# Patient Record
Sex: MALE | Race: WHITE | NOT HISPANIC OR LATINO | Employment: OTHER | URBAN - METROPOLITAN AREA
[De-identification: names, ages, dates, MRNs, and addresses within clinical notes are randomized per-mention and may not be internally consistent; named-entity substitution may affect disease eponyms.]

---

## 2020-02-18 ENCOUNTER — TELEPHONE (OUTPATIENT)
Dept: OPHTHALMOLOGY | Facility: CLINIC | Age: 70
End: 2020-02-18

## 2020-02-18 NOTE — TELEPHONE ENCOUNTER
----- Message from Emeka Milan sent at 2/18/2020  4:16 PM CST -----  Contact: pt wife Margarette   Type:  Sooner Apoointment Request    Caller is requesting a sooner appointment.  Caller declined first available appointment listed below.  Caller will not accept being placed on the waitlist and is requesting a message be sent to doctor.    Name of Caller:  Pt with Margarette   When is the first available appointment?  06/20/2020   Symptoms:    Best Call Back Number:  (Cell) or  (Home)   Additional Information:  Wife called to make an appt for cataract, glaucoma, and diabetic check up

## 2020-06-30 ENCOUNTER — OFFICE VISIT (OUTPATIENT)
Dept: OPHTHALMOLOGY | Facility: CLINIC | Age: 70
End: 2020-06-30
Payer: MEDICARE

## 2020-06-30 DIAGNOSIS — E11.9 DIABETES MELLITUS TYPE 2 WITHOUT RETINOPATHY: Primary | ICD-10-CM

## 2020-06-30 DIAGNOSIS — E11.65 TYPE 2 DIABETES MELLITUS WITH HYPERGLYCEMIA, WITHOUT LONG-TERM CURRENT USE OF INSULIN: ICD-10-CM

## 2020-06-30 DIAGNOSIS — H40.1131 PRIMARY OPEN ANGLE GLAUCOMA (POAG) OF BOTH EYES, MILD STAGE: ICD-10-CM

## 2020-06-30 DIAGNOSIS — H25.13 NUCLEAR SCLEROSIS OF BOTH EYES: ICD-10-CM

## 2020-06-30 PROCEDURE — 92004 COMPRE OPH EXAM NEW PT 1/>: CPT | Mod: S$GLB,,, | Performed by: OPHTHALMOLOGY

## 2020-06-30 PROCEDURE — 99999 PR PBB SHADOW E&M-EST. PATIENT-LVL IV: CPT | Mod: PBBFAC,,, | Performed by: OPHTHALMOLOGY

## 2020-06-30 PROCEDURE — 99999 PR PBB SHADOW E&M-EST. PATIENT-LVL IV: ICD-10-PCS | Mod: PBBFAC,,, | Performed by: OPHTHALMOLOGY

## 2020-06-30 PROCEDURE — 92004 PR EYE EXAM, NEW PATIENT,COMPREHESV: ICD-10-PCS | Mod: S$GLB,,, | Performed by: OPHTHALMOLOGY

## 2020-06-30 NOTE — PROGRESS NOTES
HPI     New pt here for comprehensive eye exam for DM. States DM is controlled   with medications. States no complaints. Denies pain/ FOL will see floaters   OU every once in a while.Using travatan z OU QHS.     Hemoglobin A1C       Date                     Value               Ref Range             Status                01/31/2020               5.7 (H)             <5.7 % of tota*       Final                  Last edited by Giuliana Lau on 6/30/2020  3:24 PM. (History)        ROS     Negative for: Constitutional, Gastrointestinal, Neurological, Skin,   Genitourinary, Musculoskeletal, HENT, Endocrine, Cardiovascular, Eyes,   Respiratory, Psychiatric, Allergic/Imm, Heme/Lymph    Last edited by London Nelson Jr., MD on 6/30/2020  3:59 PM. (History)        Assessment /Plan     For exam results, see Encounter Report.    Diabetes mellitus type 2 without retinopathy    Type 2 DM  -no retinopathy seen on DFE today  -continue good blood pressure and glucose control  -F/U for yearly DFE  Nuclear sclerosis of both eyes  -no decrease in ADLS, no significant glare, and functionality is satisfactory  -counseled on what to expect if the cataracts worsening and how it can effect the vision  -continue to monitor and if vision changes patient can call for another appointment    Primary open angle glaucoma (POAG) of both eyes, mild stage  IOP OD 11 OS 12; C:D Ratio mildly elevated no notching no disc heme  Continue travatan qhs OU  Schedule HVF OCT ON, Pachy and gonio at next visit    Follow up in about 6 months (around 12/30/2020) for Dilated Diabetic Fundus Exam.

## 2020-06-30 NOTE — PATIENT INSTRUCTIONS
Diabetic Retinopathy: Evaluating Your Eyes     Your eye healthcare provider examines your eyes with a slit lamp.   Diabetic retinopathy is a condition that happens when diabetes damages blood vessels in the rear of the eye. It can lead to vision loss. To help catch it early, have a complete dilated eye exam at least once a year. During the exam, the eye healthcare provider will review your medical history, examine your eyes, and check your vision.  Women who are pregnant and have pre-existing type 1 or type 2 diabetes have an increased risk of retinopathy. Women with diabetes should have an eye exam before pregnancy or in the first trimester. They should continue to be monitored every trimester and for 1 year after delivery, depending on the severity of the retinopathy.  Your medical history  Your eye healthcare provider may ask about the following:  · Your diabetes type, history, treatments (such as insulin), and how you monitor your blood sugar level  · Your familys health, including whether any relative has had diabetes or diabetic retinopathy  · Any diseases, surgeries, or other medical procedures youve had  · Any medicines, herbs, or supplements you use, including those you buy over the counter  · Any problems with your vision you may be having, such as blurred or double vision, problems seeing at night, or flashes or floaters   Your eye exam  Your eye healthcare provider uses an eye chart and other tools to check your vision. Then he or she examines your eyes for signs of disease. You are given eye drops to widen (dilate) your pupils. You may have one or more of the following tests:  · Tonometry to measure fluid pressure inside the eye.  · Slit lamp exam to allow the healthcare provider to view the structures of your eye.  · Ultrasound to create an image of the eye using sound waves. Ultrasound may be used if blood is found in the clear gel that fills the eye (vitreous).  · Ocular coherence tomography  (OCT) to create an image of the retina using light waves. This shows if there is fluid leaking into certain parts of the eye. It can also measure the thickness of the retina.  Fluorescein angiography  This test may be done to check the health of the inside lining of the eye (retina). It also checks the tiny blood vessels (capillaries) that carry blood to the retina. During the test:  · Photographs are taken of the retina.  · A dye is then injected into the bloodstream through the arm or hand. The dye travels to the capillaries in the eye.  · More photographs are taken of the retina. The dye causes the capillaries to stand out on the photographs.  You may feel brief nausea during the procedure. For a few hours after the test, your skin, eyes, and urine may appear yellow. Talk with your healthcare provider for more information about this test.   Date Last Reviewed: 6/1/2016  © 7849-7436 [employer]. [address] [place] [address], [place] [address]. All rights reserved. This information is not intended as a substitute for professional medical care. Always follow your healthcare professional's instructions.

## 2021-02-25 ENCOUNTER — OFFICE VISIT (OUTPATIENT)
Dept: PODIATRY | Facility: CLINIC | Age: 71
End: 2021-02-25
Payer: MEDICARE

## 2021-02-25 VITALS — HEIGHT: 65 IN | BODY MASS INDEX: 29.87 KG/M2 | WEIGHT: 179.25 LBS

## 2021-02-25 DIAGNOSIS — M20.42 HAMMERTOES OF BOTH FEET: ICD-10-CM

## 2021-02-25 DIAGNOSIS — B35.1 ONYCHOMYCOSIS DUE TO DERMATOPHYTE: ICD-10-CM

## 2021-02-25 DIAGNOSIS — E11.42 DIABETIC POLYNEUROPATHY ASSOCIATED WITH TYPE 2 DIABETES MELLITUS: ICD-10-CM

## 2021-02-25 DIAGNOSIS — M20.41 HAMMERTOES OF BOTH FEET: ICD-10-CM

## 2021-02-25 DIAGNOSIS — L84 CORN OR CALLUS: Primary | ICD-10-CM

## 2021-02-25 PROCEDURE — 11056 PARNG/CUTG B9 HYPRKR LES 2-4: CPT | Mod: Q9,S$GLB,, | Performed by: PODIATRIST

## 2021-02-25 PROCEDURE — 11721 PR DEBRIDEMENT OF NAILS, 6 OR MORE: ICD-10-PCS | Mod: 59,Q9,S$GLB, | Performed by: PODIATRIST

## 2021-02-25 PROCEDURE — 99204 OFFICE O/P NEW MOD 45 MIN: CPT | Mod: 25,S$GLB,, | Performed by: PODIATRIST

## 2021-02-25 PROCEDURE — 3008F BODY MASS INDEX DOCD: CPT | Mod: CPTII,S$GLB,, | Performed by: PODIATRIST

## 2021-02-25 PROCEDURE — 1159F MED LIST DOCD IN RCRD: CPT | Mod: S$GLB,,, | Performed by: PODIATRIST

## 2021-02-25 PROCEDURE — 3008F PR BODY MASS INDEX (BMI) DOCUMENTED: ICD-10-PCS | Mod: CPTII,S$GLB,, | Performed by: PODIATRIST

## 2021-02-25 PROCEDURE — 1126F PR PAIN SEVERITY QUANTIFIED, NO PAIN PRESENT: ICD-10-PCS | Mod: S$GLB,,, | Performed by: PODIATRIST

## 2021-02-25 PROCEDURE — 11056 PR TRIM BENIGN HYPERKERATOTIC SKIN LESION,2-4: ICD-10-PCS | Mod: Q9,S$GLB,, | Performed by: PODIATRIST

## 2021-02-25 PROCEDURE — 99999 PR PBB SHADOW E&M-EST. PATIENT-LVL III: ICD-10-PCS | Mod: PBBFAC,,, | Performed by: PODIATRIST

## 2021-02-25 PROCEDURE — 3288F FALL RISK ASSESSMENT DOCD: CPT | Mod: CPTII,S$GLB,, | Performed by: PODIATRIST

## 2021-02-25 PROCEDURE — 99204 PR OFFICE/OUTPT VISIT, NEW, LEVL IV, 45-59 MIN: ICD-10-PCS | Mod: 25,S$GLB,, | Performed by: PODIATRIST

## 2021-02-25 PROCEDURE — 1101F PT FALLS ASSESS-DOCD LE1/YR: CPT | Mod: CPTII,S$GLB,, | Performed by: PODIATRIST

## 2021-02-25 PROCEDURE — 11721 DEBRIDE NAIL 6 OR MORE: CPT | Mod: 59,Q9,S$GLB, | Performed by: PODIATRIST

## 2021-02-25 PROCEDURE — 1101F PR PT FALLS ASSESS DOC 0-1 FALLS W/OUT INJ PAST YR: ICD-10-PCS | Mod: CPTII,S$GLB,, | Performed by: PODIATRIST

## 2021-02-25 PROCEDURE — 99999 PR PBB SHADOW E&M-EST. PATIENT-LVL III: CPT | Mod: PBBFAC,,, | Performed by: PODIATRIST

## 2021-02-25 PROCEDURE — 3288F PR FALLS RISK ASSESSMENT DOCUMENTED: ICD-10-PCS | Mod: CPTII,S$GLB,, | Performed by: PODIATRIST

## 2021-02-25 PROCEDURE — 1126F AMNT PAIN NOTED NONE PRSNT: CPT | Mod: S$GLB,,, | Performed by: PODIATRIST

## 2021-02-25 PROCEDURE — 1159F PR MEDICATION LIST DOCUMENTED IN MEDICAL RECORD: ICD-10-PCS | Mod: S$GLB,,, | Performed by: PODIATRIST

## 2021-02-25 PROCEDURE — 3044F PR MOST RECENT HEMOGLOBIN A1C LEVEL <7.0%: ICD-10-PCS | Mod: CPTII,S$GLB,, | Performed by: PODIATRIST

## 2021-02-25 PROCEDURE — 3044F HG A1C LEVEL LT 7.0%: CPT | Mod: CPTII,S$GLB,, | Performed by: PODIATRIST

## 2021-06-28 ENCOUNTER — OFFICE VISIT (OUTPATIENT)
Dept: PODIATRY | Facility: CLINIC | Age: 71
End: 2021-06-28
Payer: MEDICARE

## 2021-06-28 VITALS — WEIGHT: 179.25 LBS | BODY MASS INDEX: 29.87 KG/M2 | HEIGHT: 65 IN

## 2021-06-28 DIAGNOSIS — B35.1 ONYCHOMYCOSIS DUE TO DERMATOPHYTE: ICD-10-CM

## 2021-06-28 DIAGNOSIS — L84 CORN OR CALLUS: ICD-10-CM

## 2021-06-28 DIAGNOSIS — E11.42 DIABETIC POLYNEUROPATHY ASSOCIATED WITH TYPE 2 DIABETES MELLITUS: Primary | ICD-10-CM

## 2021-06-28 DIAGNOSIS — M20.42 HAMMERTOES OF BOTH FEET: ICD-10-CM

## 2021-06-28 DIAGNOSIS — M20.41 HAMMERTOES OF BOTH FEET: ICD-10-CM

## 2021-06-28 PROCEDURE — 1101F PR PT FALLS ASSESS DOC 0-1 FALLS W/OUT INJ PAST YR: ICD-10-PCS | Mod: CPTII,S$GLB,, | Performed by: PODIATRIST

## 2021-06-28 PROCEDURE — 3044F HG A1C LEVEL LT 7.0%: CPT | Mod: CPTII,S$GLB,, | Performed by: PODIATRIST

## 2021-06-28 PROCEDURE — 3008F BODY MASS INDEX DOCD: CPT | Mod: CPTII,S$GLB,, | Performed by: PODIATRIST

## 2021-06-28 PROCEDURE — 99499 NO LOS: ICD-10-PCS | Mod: S$GLB,,, | Performed by: PODIATRIST

## 2021-06-28 PROCEDURE — 99999 PR PBB SHADOW E&M-EST. PATIENT-LVL II: ICD-10-PCS | Mod: PBBFAC,,, | Performed by: PODIATRIST

## 2021-06-28 PROCEDURE — 11721 PR DEBRIDEMENT OF NAILS, 6 OR MORE: ICD-10-PCS | Mod: Q9,S$GLB,, | Performed by: PODIATRIST

## 2021-06-28 PROCEDURE — 1125F PR PAIN SEVERITY QUANTIFIED, PAIN PRESENT: ICD-10-PCS | Mod: S$GLB,,, | Performed by: PODIATRIST

## 2021-06-28 PROCEDURE — 3044F PR MOST RECENT HEMOGLOBIN A1C LEVEL <7.0%: ICD-10-PCS | Mod: CPTII,S$GLB,, | Performed by: PODIATRIST

## 2021-06-28 PROCEDURE — 1157F PR ADVANCE CARE PLAN OR EQUIV PRESENT IN MEDICAL RECORD: ICD-10-PCS | Mod: S$GLB,,, | Performed by: PODIATRIST

## 2021-06-28 PROCEDURE — 11721 DEBRIDE NAIL 6 OR MORE: CPT | Mod: Q9,S$GLB,, | Performed by: PODIATRIST

## 2021-06-28 PROCEDURE — 1125F AMNT PAIN NOTED PAIN PRSNT: CPT | Mod: S$GLB,,, | Performed by: PODIATRIST

## 2021-06-28 PROCEDURE — 3008F PR BODY MASS INDEX (BMI) DOCUMENTED: ICD-10-PCS | Mod: CPTII,S$GLB,, | Performed by: PODIATRIST

## 2021-06-28 PROCEDURE — 3288F FALL RISK ASSESSMENT DOCD: CPT | Mod: CPTII,S$GLB,, | Performed by: PODIATRIST

## 2021-06-28 PROCEDURE — 1157F ADVNC CARE PLAN IN RCRD: CPT | Mod: S$GLB,,, | Performed by: PODIATRIST

## 2021-06-28 PROCEDURE — 99999 PR PBB SHADOW E&M-EST. PATIENT-LVL II: CPT | Mod: PBBFAC,,, | Performed by: PODIATRIST

## 2021-06-28 PROCEDURE — 99499 UNLISTED E&M SERVICE: CPT | Mod: S$GLB,,, | Performed by: PODIATRIST

## 2021-06-28 PROCEDURE — 3288F PR FALLS RISK ASSESSMENT DOCUMENTED: ICD-10-PCS | Mod: CPTII,S$GLB,, | Performed by: PODIATRIST

## 2021-06-28 PROCEDURE — 1101F PT FALLS ASSESS-DOCD LE1/YR: CPT | Mod: CPTII,S$GLB,, | Performed by: PODIATRIST

## 2021-10-28 ENCOUNTER — OFFICE VISIT (OUTPATIENT)
Dept: PODIATRY | Facility: CLINIC | Age: 71
End: 2021-10-28
Payer: MEDICAID

## 2021-10-28 DIAGNOSIS — L84 CORN OR CALLUS: ICD-10-CM

## 2021-10-28 DIAGNOSIS — B35.1 ONYCHOMYCOSIS DUE TO DERMATOPHYTE: ICD-10-CM

## 2021-10-28 DIAGNOSIS — M20.41 HAMMERTOES OF BOTH FEET: ICD-10-CM

## 2021-10-28 DIAGNOSIS — M20.42 HAMMERTOES OF BOTH FEET: ICD-10-CM

## 2021-10-28 DIAGNOSIS — R60.0 PERIPHERAL EDEMA: ICD-10-CM

## 2021-10-28 DIAGNOSIS — E11.42 DIABETIC POLYNEUROPATHY ASSOCIATED WITH TYPE 2 DIABETES MELLITUS: Primary | ICD-10-CM

## 2021-10-28 DIAGNOSIS — I87.2 VENOUS STASIS DERMATITIS OF RIGHT LOWER EXTREMITY: ICD-10-CM

## 2021-10-28 PROCEDURE — 11056 PARNG/CUTG B9 HYPRKR LES 2-4: CPT | Mod: PBBFAC,PN | Performed by: PODIATRIST

## 2021-10-28 PROCEDURE — 11721 PR DEBRIDEMENT OF NAILS, 6 OR MORE: ICD-10-PCS | Mod: 59,S$PBB,, | Performed by: PODIATRIST

## 2021-10-28 PROCEDURE — 11056 PARNG/CUTG B9 HYPRKR LES 2-4: CPT | Mod: S$PBB,,, | Performed by: PODIATRIST

## 2021-10-28 PROCEDURE — 11721 DEBRIDE NAIL 6 OR MORE: CPT | Mod: 59,S$PBB,, | Performed by: PODIATRIST

## 2021-10-28 PROCEDURE — 11056 PR TRIM BENIGN HYPERKERATOTIC SKIN LESION,2-4: ICD-10-PCS | Mod: S$PBB,,, | Performed by: PODIATRIST

## 2021-10-28 PROCEDURE — 99499 UNLISTED E&M SERVICE: CPT | Mod: S$PBB,,, | Performed by: PODIATRIST

## 2021-10-28 PROCEDURE — 11721 DEBRIDE NAIL 6 OR MORE: CPT | Mod: 59,PBBFAC,PN | Performed by: PODIATRIST

## 2021-10-28 PROCEDURE — 99499 NO LOS: ICD-10-PCS | Mod: S$PBB,,, | Performed by: PODIATRIST

## 2022-02-08 PROBLEM — R93.1 ABNORMAL ECHOCARDIOGRAM: Status: ACTIVE | Noted: 2022-02-08

## 2022-02-17 ENCOUNTER — TELEPHONE (OUTPATIENT)
Dept: VASCULAR SURGERY | Facility: CLINIC | Age: 72
End: 2022-02-17
Payer: MEDICARE

## 2022-02-17 NOTE — TELEPHONE ENCOUNTER
----- Message from Edgardo Schaeffer sent at 2/17/2022  9:11 AM CST -----  Type:  Sooner Apoointment Request    Caller is requesting a sooner appointment.  Caller declined first available appointment listed below.  Caller will not accept being placed on the waitlist and is requesting a message be sent to doctor.    Name of Caller:   Margarette Carmona (Spouse)     When is the first available appointment?  New Patient  Symptoms: Consult for surgery  Best Call Back Number:  [phone]  Additional Information:

## 2022-03-02 ENCOUNTER — TELEPHONE (OUTPATIENT)
Dept: VASCULAR SURGERY | Facility: CLINIC | Age: 72
End: 2022-03-02
Payer: MEDICARE

## 2022-03-02 NOTE — TELEPHONE ENCOUNTER
----- Message from Orquidea Jain MA sent at 3/2/2022  1:06 PM CST -----  Type: Needs Medical Advice  Who Called:  pt's wifeMargarette    Best Call Back Number: [phone]  Additional Information: wants to know if both her son & herself can come to the visit tomorrow as pt & wife are both forgetful--please advise--thank you

## 2022-03-03 ENCOUNTER — OFFICE VISIT (OUTPATIENT)
Dept: VASCULAR SURGERY | Facility: CLINIC | Age: 72
End: 2022-03-03
Payer: MEDICARE

## 2022-03-03 VITALS
BODY MASS INDEX: 26.79 KG/M2 | HEART RATE: 65 BPM | HEIGHT: 64 IN | WEIGHT: 156.94 LBS | SYSTOLIC BLOOD PRESSURE: 99 MMHG | DIASTOLIC BLOOD PRESSURE: 57 MMHG | RESPIRATION RATE: 20 BRPM

## 2022-03-03 DIAGNOSIS — I34.0 NONRHEUMATIC MITRAL VALVE REGURGITATION: ICD-10-CM

## 2022-03-03 DIAGNOSIS — I25.119 CORONARY ARTERY DISEASE INVOLVING NATIVE CORONARY ARTERY OF NATIVE HEART WITH ANGINA PECTORIS: Primary | ICD-10-CM

## 2022-03-03 PROCEDURE — 3288F PR FALLS RISK ASSESSMENT DOCUMENTED: ICD-10-PCS | Mod: CPTII,S$GLB,, | Performed by: THORACIC SURGERY (CARDIOTHORACIC VASCULAR SURGERY)

## 2022-03-03 PROCEDURE — 3074F SYST BP LT 130 MM HG: CPT | Mod: CPTII,S$GLB,, | Performed by: THORACIC SURGERY (CARDIOTHORACIC VASCULAR SURGERY)

## 2022-03-03 PROCEDURE — 3060F POS MICROALBUMINURIA REV: CPT | Mod: CPTII,S$GLB,, | Performed by: THORACIC SURGERY (CARDIOTHORACIC VASCULAR SURGERY)

## 2022-03-03 PROCEDURE — 1160F RVW MEDS BY RX/DR IN RCRD: CPT | Mod: CPTII,S$GLB,, | Performed by: THORACIC SURGERY (CARDIOTHORACIC VASCULAR SURGERY)

## 2022-03-03 PROCEDURE — 1159F PR MEDICATION LIST DOCUMENTED IN MEDICAL RECORD: ICD-10-PCS | Mod: CPTII,S$GLB,, | Performed by: THORACIC SURGERY (CARDIOTHORACIC VASCULAR SURGERY)

## 2022-03-03 PROCEDURE — 3066F NEPHROPATHY DOC TX: CPT | Mod: CPTII,S$GLB,, | Performed by: THORACIC SURGERY (CARDIOTHORACIC VASCULAR SURGERY)

## 2022-03-03 PROCEDURE — 1157F PR ADVANCE CARE PLAN OR EQUIV PRESENT IN MEDICAL RECORD: ICD-10-PCS | Mod: CPTII,S$GLB,, | Performed by: THORACIC SURGERY (CARDIOTHORACIC VASCULAR SURGERY)

## 2022-03-03 PROCEDURE — 99999 PR PBB SHADOW E&M-EST. PATIENT-LVL V: CPT | Mod: PBBFAC,,, | Performed by: THORACIC SURGERY (CARDIOTHORACIC VASCULAR SURGERY)

## 2022-03-03 PROCEDURE — 1157F ADVNC CARE PLAN IN RCRD: CPT | Mod: CPTII,S$GLB,, | Performed by: THORACIC SURGERY (CARDIOTHORACIC VASCULAR SURGERY)

## 2022-03-03 PROCEDURE — 3078F DIAST BP <80 MM HG: CPT | Mod: CPTII,S$GLB,, | Performed by: THORACIC SURGERY (CARDIOTHORACIC VASCULAR SURGERY)

## 2022-03-03 PROCEDURE — 99205 PR OFFICE/OUTPT VISIT, NEW, LEVL V, 60-74 MIN: ICD-10-PCS | Mod: S$GLB,,, | Performed by: THORACIC SURGERY (CARDIOTHORACIC VASCULAR SURGERY)

## 2022-03-03 PROCEDURE — 1159F MED LIST DOCD IN RCRD: CPT | Mod: CPTII,S$GLB,, | Performed by: THORACIC SURGERY (CARDIOTHORACIC VASCULAR SURGERY)

## 2022-03-03 PROCEDURE — 1160F PR REVIEW ALL MEDS BY PRESCRIBER/CLIN PHARMACIST DOCUMENTED: ICD-10-PCS | Mod: CPTII,S$GLB,, | Performed by: THORACIC SURGERY (CARDIOTHORACIC VASCULAR SURGERY)

## 2022-03-03 PROCEDURE — 3008F BODY MASS INDEX DOCD: CPT | Mod: CPTII,S$GLB,, | Performed by: THORACIC SURGERY (CARDIOTHORACIC VASCULAR SURGERY)

## 2022-03-03 PROCEDURE — 4010F PR ACE/ARB THEARPY RXD/TAKEN: ICD-10-PCS | Mod: CPTII,S$GLB,, | Performed by: THORACIC SURGERY (CARDIOTHORACIC VASCULAR SURGERY)

## 2022-03-03 PROCEDURE — 3066F PR DOCUMENTATION OF TREATMENT FOR NEPHROPATHY: ICD-10-PCS | Mod: CPTII,S$GLB,, | Performed by: THORACIC SURGERY (CARDIOTHORACIC VASCULAR SURGERY)

## 2022-03-03 PROCEDURE — 1126F PR PAIN SEVERITY QUANTIFIED, NO PAIN PRESENT: ICD-10-PCS | Mod: CPTII,S$GLB,, | Performed by: THORACIC SURGERY (CARDIOTHORACIC VASCULAR SURGERY)

## 2022-03-03 PROCEDURE — 4010F ACE/ARB THERAPY RXD/TAKEN: CPT | Mod: CPTII,S$GLB,, | Performed by: THORACIC SURGERY (CARDIOTHORACIC VASCULAR SURGERY)

## 2022-03-03 PROCEDURE — 3060F PR POS MICROALBUMINURIA RESULT DOCUMENTED/REVIEW: ICD-10-PCS | Mod: CPTII,S$GLB,, | Performed by: THORACIC SURGERY (CARDIOTHORACIC VASCULAR SURGERY)

## 2022-03-03 PROCEDURE — 3008F PR BODY MASS INDEX (BMI) DOCUMENTED: ICD-10-PCS | Mod: CPTII,S$GLB,, | Performed by: THORACIC SURGERY (CARDIOTHORACIC VASCULAR SURGERY)

## 2022-03-03 PROCEDURE — 1101F PT FALLS ASSESS-DOCD LE1/YR: CPT | Mod: CPTII,S$GLB,, | Performed by: THORACIC SURGERY (CARDIOTHORACIC VASCULAR SURGERY)

## 2022-03-03 PROCEDURE — 3074F PR MOST RECENT SYSTOLIC BLOOD PRESSURE < 130 MM HG: ICD-10-PCS | Mod: CPTII,S$GLB,, | Performed by: THORACIC SURGERY (CARDIOTHORACIC VASCULAR SURGERY)

## 2022-03-03 PROCEDURE — 99205 OFFICE O/P NEW HI 60 MIN: CPT | Mod: S$GLB,,, | Performed by: THORACIC SURGERY (CARDIOTHORACIC VASCULAR SURGERY)

## 2022-03-03 PROCEDURE — 99999 PR PBB SHADOW E&M-EST. PATIENT-LVL V: ICD-10-PCS | Mod: PBBFAC,,, | Performed by: THORACIC SURGERY (CARDIOTHORACIC VASCULAR SURGERY)

## 2022-03-03 PROCEDURE — 1101F PR PT FALLS ASSESS DOC 0-1 FALLS W/OUT INJ PAST YR: ICD-10-PCS | Mod: CPTII,S$GLB,, | Performed by: THORACIC SURGERY (CARDIOTHORACIC VASCULAR SURGERY)

## 2022-03-03 PROCEDURE — 3078F PR MOST RECENT DIASTOLIC BLOOD PRESSURE < 80 MM HG: ICD-10-PCS | Mod: CPTII,S$GLB,, | Performed by: THORACIC SURGERY (CARDIOTHORACIC VASCULAR SURGERY)

## 2022-03-03 PROCEDURE — 1126F AMNT PAIN NOTED NONE PRSNT: CPT | Mod: CPTII,S$GLB,, | Performed by: THORACIC SURGERY (CARDIOTHORACIC VASCULAR SURGERY)

## 2022-03-03 PROCEDURE — 3288F FALL RISK ASSESSMENT DOCD: CPT | Mod: CPTII,S$GLB,, | Performed by: THORACIC SURGERY (CARDIOTHORACIC VASCULAR SURGERY)

## 2022-03-03 RX ORDER — MAGNESIUM 30 MG
250 TABLET ORAL ONCE
COMMUNITY

## 2022-03-03 RX ORDER — LORATADINE 10 MG/1
10 TABLET ORAL DAILY
COMMUNITY

## 2022-03-03 RX ORDER — BROMPHENIRAMINE MALEATE, DEXTROMETHORPHAN HBR, PHENYLEPHRINE HCL, DIPHENHYDRAMINE HCL, PHENYLEPHRINE HCL 0.52G
0.52 KIT ORAL DAILY
COMMUNITY
End: 2023-08-21

## 2022-03-03 NOTE — PROGRESS NOTES
"OFFICE VISIT      This patient was referred to me by Dr. Olivier Romano    HISTORY OF PRESENT ILLNESS:  The patient is a 71-year-old gentleman has been experiencing shortness of breath, dyspnea on exertion, and orthopnea.  He has also having bilateral lower extremity edema for which he wears compression stockings.  He had a left heart catheterization which showed coronary artery disease and severely depressed left ventricular function with an ejection fraction of around 20%.  Echocardiogram showed moderate to severe mitral regurgitation with severely depressed left ventricular function of around 20%.  He also has mild aortic insufficiency.  It is very difficult to get a history from the patient.  He slept throughout most of the visit.  He was accompanied by his wife and his son.  They both stated that he is really slow.  When I asked his wife what that meant, she responded that "he cannot read or write well and cannot comprehend most things;  cannot fill in applications."  His wife states that is extremely forgetful, and most of the time cannot remember things that he has just done  He is also very weak and has a very unsteady gait and has had multiple falls.  His son explained that his primary care physician said that he has good muscles but the nerves are not working well and that is why he falls very frequently.  He cannot walk without a cane or a walker because he tends to fall.  He therefore sits most of the day.  He had 2 siblings who  with coronary artery disease and dementia.  When asked if he had dementia, his wife stated not that we know of.    Past Medical History:   Diagnosis Date    a Congestive Heart Failure     21 Inc. Lasix, RXd Potassium and Referred to Dr Romano     b Chronic Hypotension     b Hypertension     b Microalbuminuria     On Lisinopril 2.5 Mg Daily    Bell's palsy     c Hypercholesterolemia With Low HDL     17 RXd OTC Falxseed Oil 3K Mg Daily, And D/Cd His OTC FO " "2K Daily; On Lipitor 40 Mg qHS     d Type 2 Diabetes Mellitus     4/19/15 Referred To DM [facility]    f Obesity     g Mild Chronic NCNC Anemia     Am Monitoring    i H/O 1 PPD X 18 YRs TUD, Quit In 1995     i Left Sided Rib Area Pain     1/31/18 Ordered A Left Rib XRay Series    i Right Rib Area Pain After A Fall     2/17/20 Ordered A Right Rib Series    j Dysphagia ###    Dr. MANDY Diana; 7/1/16 EGD (With Empiric Dilation) = Normal Except For Gastritis (Was BXd)    j GERD With Gastritis     Dr. MANDY Diana; 7/1/16 EGD (With Empiric Dilation) = Normal Except For Gastritis (Was BXd)    j H/O Colon Polyps On 5/31/19 TC ###    Dr. MANDY Diana: "Repeat TC In 3 Years"    k Benign Prostatic Hypertrophy     Dr. Sam Rosales    k Chronic Interstitial Cystitis ###    Dr. Sam Rosales; Elmiron Caused Bilateral Leg Pruritis    l Bilateral Knee Osteoarthritis     12/9/16 Referred To Dr. Eder ugalde Fall With Right Sided Pain 10/20/15     l H/O Bilateral Knee Arthroscopy Procedures     l Lumbar Spinal DDD     4/19/15 RXd Mobic 15 Mg Daily PRN; He Has Had No SX Or ESIs    m Bilateral Lower Extremity Diabetic Peripheral Neuropathy     2/13/19 RXd Neurontin 100 Mg BID    n Anxiety And Depression     o Allergic Rhinosinusitis     Dr. Sahil Burks    o Chronic Bilateral Tinnitus And Hearing Loss     Dr. Sahil Burks; Christiano Barnard (Audiologist) 6/27/16 Tested Patient And Recommended Hearing Aids    o Deviated Nasal Septum     Dr. Sahil Burks    o Poor Dentition ###    5/24/19 He Will Be Having A Complete Dental Extraction Soon (I Filled Forms Out Today)    o Recurrent Bilateral Cerumen Impactions     Dr. Sahil Burks 6/15/16 Performed Disimpaction    p OU Glaucoma     Dr. Villarreal "Arlen" Vanessa    q Bilateral Lower Extremity Pruritus ###    RTC In 2 Weeks; 5/24/19 Held Elmiron And RXd Lotrisone Cream BID; 6/3/19 BLE AA U/S = Unremarkable (See Report)    q Borderline Vitamin D " Deficiency     2/10/19 RXd OTC D3 2K IU Daily    q Cherry Hemangiomas     Dr. Anthony Dorantes; Right Postauricular Hemangioma Was Resected 7/27/16    q H/O Right Buttock Hematoma 10/20/15     q Seborrheic Keratoses     Dr. Anthony Dorantes; Right Postauricular Seborrheic Keratosis Was Resected 7/27/16    Wellness Visit 2/17/2021 #######################    Dr. De Oliveira, this pt qualifies for advanced illness and frailty exclusion based on diagnoses in his chart. After his visit on Monday, please code for the following: combined systolic and diastolic CHF, venous ulcer of L leg/wound of RLE; limited level of ambulation. By adding these codes, an orange frailty banner should populate and be visible upon opening his chart. Please let me know if you have any       Past Surgical History:   Procedure Laterality Date    CATHETERIZATION OF BOTH LEFT AND RIGHT HEART N/A 2/8/2022    Procedure: CATHETERIZATION, HEART, BOTH LEFT AND RIGHT;  Surgeon: Olivier Romano MD;  Location: [facility] CATH;  Service: Cardiology;  Laterality: N/A;    COLONOSCOPY      CORONARY ANGIOGRAPHY N/A 2/8/2022    Procedure: ANGIOGRAM, CORONARY ARTERY;  Surgeon: Olivier Romano MD;  Location: [facility] CATH;  Service: Cardiology;  Laterality: N/A;    HERNIA REPAIR      KNEE SURGERY      NOSE SURGERY      TESTICLE SURGERY         Review of patient's allergies indicates:   Allergen Reactions    Elmiron [pentosan polysulfate sodium]      Bilateral Lower Extremity Pruritis       Current Outpatient Medications   Medication Sig Dispense Refill    acetaminophen (TYLENOL) 325 MG tablet Take 1 tablet (325 mg total) by mouth 2 (two) times daily. (Patient taking differently: Take 325 mg by mouth once daily.) 60 tablet 3    aspirin (ECOTRIN) 81 MG EC tablet Take 1 tablet (81 mg total) by mouth once daily. 90 tablet 3    atorvastatin (LIPITOR) 40 MG tablet Take 1 tablet (40 mg total) by mouth nightly. 90 tablet 3    baclofen (LIORESAL) 10 MG tablet TAKE 1  TABLET 3 TIMES DAILYAS NEEDED 270 tablet 3    blood glucose control, low (EASYMAX LOW CONTROL) Soln 1 each by Misc.(Non-Drug; Combo Route) route before breakfast. 3 each 3    blood sugar diagnostic Strp To check BG 2 times daily, to use with insurance preferred meter 200 each 3    bumetanide (BUMEX) 2 MG tablet Take 1 tablet (2 mg total) by mouth once daily. 30 tablet 11    cholecalciferol, vitamin D3, (VITAMIN D3) 50 mcg (2,000 unit) Tab Take 1 tablet (2,000 Units total) by mouth once daily.      clotrimazole-betamethasone 1-0.05% (LOTRISONE) cream Apply topically 2 (two) times daily. Apply topically to affected area bid 45 g 1    esomeprazole (NEXIUM) 40 MG capsule Take 1 capsule (40 mg total) by mouth before breakfast. 90 capsule 3    finasteride (PROSCAR) 5 mg tablet Take 1 tablet (5 mg total) by mouth once daily. 90 tablet 3    gabapentin (NEURONTIN) 100 MG capsule Take 1 capsule (100 mg total) by mouth 2 (two) times daily. 180 capsule 3    Lactobacillus rhamnosus GG (CULTURELLE) 10 billion cell capsule Take 1 capsule by mouth once daily.      lancets Misc To check BG 2 times daily, to use with insurance preferred meter 200 each 3    loratadine (CLARITIN) 10 mg tablet Take 10 mg by mouth once daily.      magnesium 30 mg Tab Take 250 mg by mouth once.      meloxicam (MOBIC) 15 MG tablet Take 1 tablet (15 mg total) by mouth daily as needed. 90 tablet 3    metFORMIN (GLUCOPHAGE) 1000 MG tablet Take 1 tablet (1,000 mg total) by mouth 2 (two) times daily. 180 tablet 3    metoprolol succinate (TOPROL-XL) 25 MG 24 hr tablet Take 1 tablet (25 mg total) by mouth once daily. 30 tablet 11    mirabegron (MYRBETRIQ) 25 mg Tb24 ER tablet Take 1 tablet (25 mg total) by mouth once daily. 90 tablet 3    multivitamin capsule Take 1 capsule by mouth once daily.      olopatadine (PATANOL) 0.1 % ophthalmic solution Place 1 drop into the left eye 2 (two) times daily. 5 mL 1    potassium chloride SA  (K-DUR,KLOR-CON) 10 MEQ tablet Take 1 tablet (10 mEq total) by mouth 2 (two) times daily. 60 tablet 3    psyllium 0.52 gram capsule Take 0.52 g by mouth once daily.      sacubitriL-valsartan (ENTRESTO) 24-26 mg per tablet Take 1 tablet by mouth 2 (two) times daily. Start taking on 2/10/22 60 tablet 11    silver sulfADIAZINE 1% (SILVADENE) 1 % cream Apply to affected area daily 50 g 1    solifenacin (VESICARE) 5 MG tablet Take 1 tablet (5 mg total) by mouth once daily. 90 tablet 3    tamsulosin (FLOMAX) 0.4 mg Cap Take 1 capsule (0.4 mg total) by mouth once daily. 90 capsule 3    travoprost (TRAVATAN Z) 0.004 % ophthalmic solution Place 1 drop into both eyes once daily. 5 mL 2    psyllium (METAMUCIL) packet Take 1 packet by mouth once daily.       No current facility-administered medications for this visit.       Family History   Problem Relation Age of Onset    Diabetes Mother     Heart disease Mother     Hypertension Mother     COPD Mother     Arthritis Mother     Cancer Sister        Social History     Socioeconomic History    Marital status:    Tobacco Use    Smoking status: Former Smoker     Quit date:      Years since quittin.1    Smokeless tobacco: Never Used   Substance and Sexual Activity    Alcohol use: Never    Drug use: Never       REVIEW OF SYSTEMS:  As per history of present illness.        PHYSICAL EXAM:  Physical Exam    Vitals:    22 1459   BP: (!) 99/57   Pulse: 65   Resp: 20     The patient was initially awake but then fell asleep and was repeatedly awakened his wife after which he would fall asleep again.  As a matter of fact he was asleep during most of the visit.  When asked questions, he would respond in the affirmative both to his family and to me.  He had no questions and was pretty much not involved in the conversation.  Head is normocephalic.  Atraumatic.  Pupils are equal, round and reactive.  Sclerae anicteric.  Neck is supple without jugular vein  distension trachea is midline.  Heart has a regular rate and rhythm without murmur.  Lungs are clear.  Abdomen is soft and nontender.  Extremities are warm and adequately perfused.  Neurologic:  The patient fell asleep for most of the visit.  He has no lateralizing neurologic deficits.  However his gait is very unsteady and used a walker to ambulate.  He was also held up by his son.    Transthoracic echo (TTE) complete  Order# [id number]  Reading physician: Olivier Romano MD Ordering physician: Olivier Romano MD Study date: 1/19/22       Reason for Exam  Priority: Routine  Dx: Localized edema [R60.0 (ICD-10-CM)]     Result Image Hyperlink     Show images for Echo    Summary    · The estimated ejection fraction is 20%.  · There is severe left ventricular global hypokinesis.  · The left ventricle is severely enlarged  · Severe left atrial enlargement.  · The estimated PA systolic pressure is 48 mmHg.  · There is pulmonary hypertension.  · Normal right ventricular size with normal right ventricular systolic function.  · Mild aortic regurgitation.  · Moderate-to-severe posteriorly directed mitral regurgitation.  · Mild tricuspid regurgitation.  · Trivial pericardial effusion.  · There is a left pleural effusion.          Coronary Findings      Diagnostic  Dominance: Right      Left Main   The vessel was visualized by angiography and is moderate in size. There is mild diffuse disease throughout the vessel. The vessel is calcified.   Left Anterior Descending   The vessel was visualized by angiography and is moderate in size. There is severe diffuse disease throughout the vessel. The vessel is severely calcified.   Mid LAD-1 lesion is 90% stenosed. The lesion was calcified. The calcification amount was moderate to severe.   Mid LAD-2 lesion is 80% stenosed. This appears to be a dual LAD system   Left Circumflex   The vessel was visualized by angiography and is moderate in size. There is mild diffuse disease throughout  the vessel.   Prox Cx lesion is 60% stenosed.   Right Coronary Artery   The vessel was visualized by angiography and is large. There is mild diffuse disease throughout the vessel.   Mid RCA lesion is 90% stenosed. The lesion was located at the bend and calcified. The calcification amount was severe.     Intervention      No interventions have been documented.    Left Heart Findings      Left Ventricle    LVEDP (Post):30 mmHG       Right Heart Findings      Right Heart Pressures    RA: 11 RV: 46/ 9/ 14 PA: 43/ 20/ 29 PWP: 27 .   Cardiac output was 3.27  by thermodilution. Cardiac index is 1.89 L/min/m2.       Signed    Procedure Log and Final Result signed by Olivier Romano MD on 2/10/22 at 1306 CST     Physician Certification    I certify that I was present for catheter insertion, catheter manipulation, angiography, and angiographic interpretation of this patient.      Reviewed By    Olivier Romano MD on 2/10/2022 18:31         IMPRESSION:  1. Coronary artery disease  2. Moderate to severe mitral regurgitation  3. Cardiomyopathy with severely depressed left ventricular function with an ejection fraction estimated at around 20%.  4. Mild aortic insufficiency  5. Pulmonary hypertension  6. Congestive heart failure  7. Edema of the lower extremities  8. Severe weakness of bilateral lower extremities   9. Shortness of breath  10. Orthopnea  11. Dyspnea on exertion    12. Severe memory problems and forgetfulness.    13. Questionable dementia.  14. Diabetes mellitus   15. Hypertension  16. Hyperlipidemia  17. Chronic interstitial cystitis  18. Severe balance and equilibrium disturbance affecting ambulation     RECOMMENDATIONS:  The patient has coronary artery disease with moderate to severe mitral regurgitation, severely depressed left ventricular function (20%) with  congestive heart failure, pulmonary hypertension, mild aortic insufficiency, and other comorbidities.  He has severe problems with weakness of the lower  extremities and problems with balance and equilibrium which prevent him from walking.  His family states that he sits down most of the day.  He is extremely forgetful and has severe problems with memory and his wife states that he does not comprehend most things to the point that he cannot fill out forms.  He slept throughout most of the visit which prompted his wife to wake him up and he would just fall back to sleep.  Whenever his wife, son or myself would ask him a question, he would just answer in the affirmative.  He really was never involved in the conversation.  This patient is at extremely high risk for mortality and morbidity if we proceed with coronary artery bypass grafting and mitral valve replacement.  It will also be difficult for him to recuperate from such a big operation if he survived it because of the severe weakness of the lower extremities, problems with balance and equilibrium which prevents him from walking, problems with memory and forgetfulness causing problems with following instructions.  I did consider placing an Impella left ventricular assist device for a few days and then proceed with coronary artery bypass graft and mitral regurgitation.  However he does have some aortic insufficiency and aortic insufficiency is a relative contraindication to the use of the Impella device, or an intra-aortic balloon pump.  Another option is to do percutaneous coronary intervention of the coronary arteries.  This might help the left ventricular function and improve his mitral regurgitation.  In the event that that does not improve a MitraClip could be considered.  I will discuss this with Dr. Olivier Romano.        Alonzo Rubio MD

## 2022-03-08 ENCOUNTER — OFFICE VISIT (OUTPATIENT)
Dept: PODIATRY | Facility: CLINIC | Age: 72
End: 2022-03-08
Payer: MEDICARE

## 2022-03-08 VITALS — HEIGHT: 64 IN | WEIGHT: 156.94 LBS | BODY MASS INDEX: 26.79 KG/M2

## 2022-03-08 DIAGNOSIS — M20.41 HAMMERTOES OF BOTH FEET: ICD-10-CM

## 2022-03-08 DIAGNOSIS — L02.611 ABSCESS OF TOE, RIGHT: Primary | ICD-10-CM

## 2022-03-08 DIAGNOSIS — E11.42 DIABETIC POLYNEUROPATHY ASSOCIATED WITH TYPE 2 DIABETES MELLITUS: ICD-10-CM

## 2022-03-08 DIAGNOSIS — M20.42 HAMMERTOES OF BOTH FEET: ICD-10-CM

## 2022-03-08 DIAGNOSIS — M79.674 TOE PAIN, RIGHT: ICD-10-CM

## 2022-03-08 PROCEDURE — 99213 PR OFFICE/OUTPT VISIT, EST, LEVL III, 20-29 MIN: ICD-10-PCS | Mod: 25,S$GLB,, | Performed by: PODIATRIST

## 2022-03-08 PROCEDURE — 1126F PR PAIN SEVERITY QUANTIFIED, NO PAIN PRESENT: ICD-10-PCS | Mod: CPTII,S$GLB,, | Performed by: PODIATRIST

## 2022-03-08 PROCEDURE — 3288F FALL RISK ASSESSMENT DOCD: CPT | Mod: CPTII,S$GLB,, | Performed by: PODIATRIST

## 2022-03-08 PROCEDURE — 3288F PR FALLS RISK ASSESSMENT DOCUMENTED: ICD-10-PCS | Mod: CPTII,S$GLB,, | Performed by: PODIATRIST

## 2022-03-08 PROCEDURE — 99213 OFFICE O/P EST LOW 20 MIN: CPT | Mod: 25,S$GLB,, | Performed by: PODIATRIST

## 2022-03-08 PROCEDURE — 3066F PR DOCUMENTATION OF TREATMENT FOR NEPHROPATHY: ICD-10-PCS | Mod: CPTII,S$GLB,, | Performed by: PODIATRIST

## 2022-03-08 PROCEDURE — 4010F PR ACE/ARB THEARPY RXD/TAKEN: ICD-10-PCS | Mod: CPTII,S$GLB,, | Performed by: PODIATRIST

## 2022-03-08 PROCEDURE — 1159F MED LIST DOCD IN RCRD: CPT | Mod: CPTII,S$GLB,, | Performed by: PODIATRIST

## 2022-03-08 PROCEDURE — 87075 CULTR BACTERIA EXCEPT BLOOD: CPT | Performed by: PODIATRIST

## 2022-03-08 PROCEDURE — 87186 SC STD MICRODIL/AGAR DIL: CPT | Performed by: PODIATRIST

## 2022-03-08 PROCEDURE — 99999 PR PBB SHADOW E&M-EST. PATIENT-LVL II: ICD-10-PCS | Mod: PBBFAC,,, | Performed by: PODIATRIST

## 2022-03-08 PROCEDURE — 1126F AMNT PAIN NOTED NONE PRSNT: CPT | Mod: CPTII,S$GLB,, | Performed by: PODIATRIST

## 2022-03-08 PROCEDURE — 87077 CULTURE AEROBIC IDENTIFY: CPT | Performed by: PODIATRIST

## 2022-03-08 PROCEDURE — 10060 INCISION AND DRAINAGE: ICD-10-PCS | Mod: RT,S$GLB,, | Performed by: PODIATRIST

## 2022-03-08 PROCEDURE — 3060F PR POS MICROALBUMINURIA RESULT DOCUMENTED/REVIEW: ICD-10-PCS | Mod: CPTII,S$GLB,, | Performed by: PODIATRIST

## 2022-03-08 PROCEDURE — 99999 PR PBB SHADOW E&M-EST. PATIENT-LVL II: CPT | Mod: PBBFAC,,, | Performed by: PODIATRIST

## 2022-03-08 PROCEDURE — 3008F PR BODY MASS INDEX (BMI) DOCUMENTED: ICD-10-PCS | Mod: CPTII,S$GLB,, | Performed by: PODIATRIST

## 2022-03-08 PROCEDURE — 4010F ACE/ARB THERAPY RXD/TAKEN: CPT | Mod: CPTII,S$GLB,, | Performed by: PODIATRIST

## 2022-03-08 PROCEDURE — 3008F BODY MASS INDEX DOCD: CPT | Mod: CPTII,S$GLB,, | Performed by: PODIATRIST

## 2022-03-08 PROCEDURE — 3066F NEPHROPATHY DOC TX: CPT | Mod: CPTII,S$GLB,, | Performed by: PODIATRIST

## 2022-03-08 PROCEDURE — 3060F POS MICROALBUMINURIA REV: CPT | Mod: CPTII,S$GLB,, | Performed by: PODIATRIST

## 2022-03-08 PROCEDURE — 1157F PR ADVANCE CARE PLAN OR EQUIV PRESENT IN MEDICAL RECORD: ICD-10-PCS | Mod: CPTII,S$GLB,, | Performed by: PODIATRIST

## 2022-03-08 PROCEDURE — 1157F ADVNC CARE PLAN IN RCRD: CPT | Mod: CPTII,S$GLB,, | Performed by: PODIATRIST

## 2022-03-08 PROCEDURE — 1101F PT FALLS ASSESS-DOCD LE1/YR: CPT | Mod: CPTII,S$GLB,, | Performed by: PODIATRIST

## 2022-03-08 PROCEDURE — 1101F PR PT FALLS ASSESS DOC 0-1 FALLS W/OUT INJ PAST YR: ICD-10-PCS | Mod: CPTII,S$GLB,, | Performed by: PODIATRIST

## 2022-03-08 PROCEDURE — 10060 I&D ABSCESS SIMPLE/SINGLE: CPT | Mod: RT,S$GLB,, | Performed by: PODIATRIST

## 2022-03-08 PROCEDURE — 1159F PR MEDICATION LIST DOCUMENTED IN MEDICAL RECORD: ICD-10-PCS | Mod: CPTII,S$GLB,, | Performed by: PODIATRIST

## 2022-03-08 PROCEDURE — 87070 CULTURE OTHR SPECIMN AEROBIC: CPT | Performed by: PODIATRIST

## 2022-03-08 NOTE — PROCEDURES
Incision and Drainage    Date/Time: 3/8/2022 11:00 AM  Performed by: Beka Choudhury DPM  Authorized by: Beka Choudhury DPM     Consent Done?:  Yes (Verbal)    Type:  Abscess  Body area:  Lower extremity  Location details:  Right second toe  Incision type:  Single straight (Performed with iris scissors and forceps)  Incision depth: subcutaneous    Complexity:  Simple  Drainage:  Pus (1 mL)  Drainage amount:  Moderate  Wound treatment:  Wound left open  Packing material:  None  Patient tolerance:  Patient tolerated the procedure well with no immediate complications

## 2022-03-08 NOTE — PROGRESS NOTES
Subjective:      Patient ID: Lester Carmona is a 71 y.o. male.    Chief Complaint: Diabetes Mellitus ([place]) and Toe Pain (Rt 2nd toe possible wound)    Lester is a 71 y.o. male with a past medical history of a Congestive Heart Failure, b Chronic Hypotension, b Hypertension, b Microalbuminuria, Bell's palsy, c Hypercholesterolemia With Low HDL, d Type 2 Diabetes Mellitus, f Obesity, g Mild Chronic NCNC Anemia, i H/O 1 PPD X 18 YRs TUD, Quit In 1995, i Left Sided Rib Area Pain, i Right Rib Area Pain After A Fall, j Dysphagia (###), j GERD With Gastritis, j H/O Colon Polyps On 5/31/19 TC (###), k Benign Prostatic Hypertrophy, k Chronic Interstitial Cystitis (###), l Bilateral Knee Osteoarthritis, l Fall With Right Sided Pain 10/20/15, l H/O Bilateral Knee Arthroscopy Procedures, l Lumbar Spinal DDD, m Bilateral Lower Extremity Diabetic Peripheral Neuropathy, n Anxiety And Depression, o Allergic Rhinosinusitis, o Chronic Bilateral Tinnitus And Hearing Loss, o Deviated Nasal Septum, o Poor Dentition (###), o Recurrent Bilateral Cerumen Impactions, p OU Glaucoma, q Bilateral Lower Extremity Pruritus (###), q Borderline Vitamin D Deficiency, q Cherry Hemangiomas, q H/O Right Buttock Hematoma 10/20/15, q Seborrheic Keratoses, and Wellness Visit 2/17/2021 (#######################). Patient relates acute pain from the Rt. 2nd toe that has been progressively worsening over the past several days.  Symptoms are localized to the tip of the digit and sharp in nature.  Currently rates pain as a 0/10 while at rest.  Symptoms are aggravated with applied pressure.  Denies sustaining recent trauma to said limb.  Has not attempted to self treat.  Denies noticing localized signs of infection to the digit.  Denies any additional pedal complaints.      Hemoglobin A1C   Date Value Ref Range Status   07/14/2021 5.6 <5.7 % of total Hgb Final     Comment:     For the purpose of screening for the presence of  diabetes:     <5.7%        Consistent with the absence of diabetes  5.7-6.4%    Consistent with increased risk for diabetes              (prediabetes)  > or =6.5%  Consistent with diabetes     This assay result is consistent with a decreased risk  of diabetes.     Currently, no consensus exists regarding use of  hemoglobin A1c for diagnosis of diabetes in children.     According to American Diabetes Association (ADA)  guidelines, hemoglobin A1c <7.0% represents optimal  control in non-pregnant diabetic patients. Different  metrics may apply to specific patient populations.   Standards of Medical Care in Diabetes(ADA).         02/12/2021 5.4 <5.7 % of total Hgb Final     Comment:     For the purpose of screening for the presence of  diabetes:     <5.7%       Consistent with the absence of diabetes  5.7-6.4%    Consistent with increased risk for diabetes              (prediabetes)  > or =6.5%  Consistent with diabetes     This assay result is consistent with a decreased risk  of diabetes.     Currently, no consensus exists regarding use of  hemoglobin A1c for diagnosis of diabetes in children.     According to American Diabetes Association (ADA)  guidelines, hemoglobin A1c <7.0% represents optimal  control in non-pregnant diabetic patients. Different  metrics may apply to specific patient populations.   Standards of Medical Care in Diabetes(ADA).         01/31/2020 5.7 (H) <5.7 % of total Hgb Final     Comment:     For someone without known diabetes, a hemoglobin   A1c value between 5.7% and 6.4% is consistent with  prediabetes and should be confirmed with a   follow-up test.     For someone with known diabetes, a value <7%  indicates that their diabetes is well controlled. A1c  targets should be individualized based on duration of  diabetes, age, comorbid conditions, and other  considerations.     This assay result is consistent with an increased risk  of diabetes.     Currently, no consensus exists regarding use of  hemoglobin A1c for  "diagnosis of diabetes for children.                Past Medical History:   Diagnosis Date    a Congestive Heart Failure     12/09/21 Inc. Lasix, RXd Potassium and Referred to Dr Juan Antonio larsen Chronic Hypotension     b Hypertension     b Microalbuminuria     On Lisinopril 2.5 Mg Daily    Bell's palsy     c Hypercholesterolemia With Low HDL     8/7/17 RXd OTC Falxseed Oil 3K Mg Daily, And D/Cd His OTC FO 2K Daily; On Lipitor 40 Mg qHS     d Type 2 Diabetes Mellitus     4/19/15 Referred To [facility]    f Obesity     g Mild Chronic NCNC Anemia     Am Monitoring    i H/O 1 PPD X 18 YRs TUD, Quit In 1995     i Left Sided Rib Area Pain     1/31/18 Ordered A Left Rib XRay Series    i Right Rib Area Pain After A Fall     2/17/20 Ordered A Right Rib Series    j Dysphagia ###    Dr. MANDY Diana; 7/1/16 EGD (With Empiric Dilation) = Normal Except For Gastritis (Was BXd)    j GERD With Gastritis     Dr. MANDY Diana; 7/1/16 EGD (With Empiric Dilation) = Normal Except For Gastritis (Was BXd)    j H/O Colon Polyps On 5/31/19 TC ###    Dr. MANDY Diana: "Repeat TC In 3 Years"    k Benign Prostatic Hypertrophy     Dr. Sam Rosales    k Chronic Interstitial Cystitis ###    Dr. Sam Rosales; Elmiron Caused Bilateral Leg Pruritis    l Bilateral Knee Osteoarthritis     12/9/16 Referred To Dr. Eder ugalde Fall With Right Sided Pain 10/20/15     l H/O Bilateral Knee Arthroscopy Procedures     l Lumbar Spinal DDD     4/19/15 RXd Mobic 15 Mg Daily PRN; He Has Had No SX Or ESIs    m Bilateral Lower Extremity Diabetic Peripheral Neuropathy     2/13/19 RXd Neurontin 100 Mg BID    n Anxiety And Depression     o Allergic Rhinosinusitis     Dr. Sahil Burks    o Chronic Bilateral Tinnitus And Hearing Loss     Dr. Sahil Burks; Christiano Barnard (Audiologist) 6/27/16 Tested Patient And Recommended Hearing Aids    o Deviated Nasal Septum     Dr. Sahil Burks    o Poor Dentition ###    5/24/19 He Will " "Be Having A Complete Dental Extraction Soon (I Filled Forms Out Today)    o Recurrent Bilateral Cerumen Impactions     Dr. Sahil Burks 6/15/16 Performed Disimpaction    p OU Glaucoma     Dr. Villarreal "Arlen" Vanessa    q Bilateral Lower Extremity Pruritus ###    RTC In 2 Weeks; 5/24/19 Held Elmiron And RXd Lotrisone Cream BID; 6/3/19 BLE AA U/S = Unremarkable (See Report)    q Borderline Vitamin D Deficiency     2/10/19 RXd OTC D3 2K IU Daily    q Cherry Hemangiomas     Dr. Anthony Dorantes; Right Postauricular Hemangioma Was Resected 7/27/16    q H/O Right Buttock Hematoma 10/20/15     q Seborrheic Keratoses     Dr. Anthony Dorantes; Right Postauricular Seborrheic Keratosis Was Resected 7/27/16    Wellness Visit 2/17/2021 #######################    Dr. De Oliveira, this pt qualifies for advanced illness and frailty exclusion based on diagnoses in his chart. After his visit on Monday, please code for the following: combined systolic and diastolic CHF, venous ulcer of L leg/wound of RLE; limited level of ambulation. By adding these codes, an orange frailty banner should populate and be visible upon opening his chart. Please let me know if you have any       Past Surgical History:   Procedure Laterality Date    CATHETERIZATION OF BOTH LEFT AND RIGHT HEART N/A 2/8/2022    Procedure: CATHETERIZATION, HEART, BOTH LEFT AND RIGHT;  Surgeon: Olivier Romano MD;  Location: [facility] CATH;  Service: Cardiology;  Laterality: N/A;    COLONOSCOPY      CORONARY ANGIOGRAPHY N/A 2/8/2022    Procedure: ANGIOGRAM, CORONARY ARTERY;  Surgeon: Olivier Romano MD;  Location: [facility] CATH;  Service: Cardiology;  Laterality: N/A;    HERNIA REPAIR      KNEE SURGERY      NOSE SURGERY      TESTICLE SURGERY         Family History   Problem Relation Age of Onset    Diabetes Mother     Heart disease Mother     Hypertension Mother     COPD Mother     Arthritis Mother     Cancer Sister        Social History     Socioeconomic History    " Marital status:    Tobacco Use    Smoking status: Former Smoker     Quit date:      Years since quittin.2    Smokeless tobacco: Never Used   Substance and Sexual Activity    Alcohol use: Never    Drug use: Never       Current Outpatient Medications   Medication Sig Dispense Refill    acetaminophen (TYLENOL) 325 MG tablet Take 1 tablet (325 mg total) by mouth 2 (two) times daily. (Patient taking differently: Take 325 mg by mouth once daily.) 60 tablet 3    aspirin (ECOTRIN) 81 MG EC tablet Take 1 tablet (81 mg total) by mouth once daily. 90 tablet 3    atorvastatin (LIPITOR) 40 MG tablet Take 1 tablet (40 mg total) by mouth nightly. 90 tablet 3    baclofen (LIORESAL) 10 MG tablet TAKE 1 TABLET 3 TIMES DAILYAS NEEDED 270 tablet 3    blood glucose control, low (EASYMAX LOW CONTROL) Soln 1 each by Misc.(Non-Drug; Combo Route) route before breakfast. 3 each 3    blood sugar diagnostic Strp To check BG 2 times daily, to use with insurance preferred meter 200 each 3    bumetanide (BUMEX) 2 MG tablet Take 1 tablet (2 mg total) by mouth once daily. 30 tablet 11    cholecalciferol, vitamin D3, (VITAMIN D3) 50 mcg (2,000 unit) Tab Take 1 tablet (2,000 Units total) by mouth once daily.      clotrimazole-betamethasone 1-0.05% (LOTRISONE) cream Apply topically 2 (two) times daily. Apply topically to affected area bid 45 g 1    esomeprazole (NEXIUM) 40 MG capsule Take 1 capsule (40 mg total) by mouth before breakfast. 90 capsule 3    finasteride (PROSCAR) 5 mg tablet Take 1 tablet (5 mg total) by mouth once daily. 90 tablet 3    gabapentin (NEURONTIN) 100 MG capsule Take 1 capsule (100 mg total) by mouth 2 (two) times daily. 180 capsule 3    Lactobacillus rhamnosus GG (CULTURELLE) 10 billion cell capsule Take 1 capsule by mouth once daily.      lancets Misc To check BG 2 times daily, to use with insurance preferred meter 200 each 3    loratadine (CLARITIN) 10 mg tablet Take 10 mg by mouth once  daily.      magnesium 30 mg Tab Take 250 mg by mouth once.      meloxicam (MOBIC) 15 MG tablet Take 1 tablet (15 mg total) by mouth daily as needed. 90 tablet 3    metFORMIN (GLUCOPHAGE) 1000 MG tablet Take 1 tablet (1,000 mg total) by mouth 2 (two) times daily. 180 tablet 3    metoprolol succinate (TOPROL-XL) 25 MG 24 hr tablet Take 1 tablet (25 mg total) by mouth once daily. 30 tablet 11    mirabegron (MYRBETRIQ) 25 mg Tb24 ER tablet Take 1 tablet (25 mg total) by mouth once daily. 90 tablet 3    multivitamin capsule Take 1 capsule by mouth once daily.      olopatadine (PATANOL) 0.1 % ophthalmic solution Place 1 drop into the left eye 2 (two) times daily. 5 mL 1    potassium chloride SA (K-DUR,KLOR-CON) 10 MEQ tablet Take 1 tablet (10 mEq total) by mouth 2 (two) times daily. 60 tablet 3    psyllium (METAMUCIL) packet Take 1 packet by mouth once daily.      psyllium 0.52 gram capsule Take 0.52 g by mouth once daily.      sacubitriL-valsartan (ENTRESTO) 24-26 mg per tablet Take 1 tablet by mouth 2 (two) times daily. Start taking on 2/10/22 60 tablet 11    silver sulfADIAZINE 1% (SILVADENE) 1 % cream Apply to affected area daily 50 g 1    solifenacin (VESICARE) 5 MG tablet Take 1 tablet (5 mg total) by mouth once daily. 90 tablet 3    tamsulosin (FLOMAX) 0.4 mg Cap Take 1 capsule (0.4 mg total) by mouth once daily. 90 capsule 3    travoprost (TRAVATAN Z) 0.004 % ophthalmic solution Place 1 drop into both eyes once daily. 5 mL 2     No current facility-administered medications for this visit.       Review of patient's allergies indicates:   Allergen Reactions    Elmiron [pentosan polysulfate sodium]      Bilateral Lower Extremity Pruritis         Review of Systems   Constitutional: Negative for chills and fever.   Cardiovascular: Positive for leg swelling. Negative for claudication.   Skin: Positive for color change and nail changes. Negative for dry skin and rash.   Musculoskeletal: Negative for  muscle cramps, muscle weakness and myalgias.   Gastrointestinal: Negative for nausea and vomiting.   Neurological: Positive for numbness. Negative for paresthesias.   Psychiatric/Behavioral: Negative for altered mental status.           Objective:      Physical Exam  Constitutional:       Appearance: Normal appearance. He is not ill-appearing.   Cardiovascular:      Pulses:           Dorsalis pedis pulses are 2+ on the right side and 2+ on the left side.        Posterior tibial pulses are 2+ on the right side and 2+ on the left side.      Comments: CFT is < 3 seconds bilateral.  Pedal hair growth is decreased bilateral.  Varicosities noted bilateral.  Moderate nonpitting lower extremity edema noted bilateral.  Toes are cool to touch bilateral.    Musculoskeletal:         General: Tenderness and deformity present.      Right lower leg: Edema present.      Left lower leg: Edema present.      Comments: Muscle strength 5/5 in all muscle groups bilateral.  No tenderness nor crepitation with ROM of foot/ankle joints bilateral.  Pain with palpation to the abscess at the tip of the Rt. 2nd toe.  Bilateral rigid contracture of toes 2-5.     Skin:     General: Skin is warm and dry.      Capillary Refill: Capillary refill takes 2 to 3 seconds.      Findings: Wound present. No bruising, ecchymosis, erythema, signs of injury, laceration, lesion, petechiae or rash.      Comments: Location: abscess noted to the distal tip of the Rt. 2nd toe  Base: Down to subQ and comprised of healthy granulation.  Drainage: 1mL of pus  Damari wound: Devoid of erythema, edema,and malodor.  Fluctuance and purulence noted.   Pre-debridement measurement: 0 x 0 x 0 cm.  Post-debridement measurement: 1.3 x 2.4 x 0.2cm   Neurological:      Mental Status: He is alert.      Sensory: Sensory deficit present.      Motor: No weakness or atrophy.      Comments: Protective sensation per [place] monofilament is absent bilateral.  Vibratory sensation is  absent bilateral.  Light touch is absent bilateral.               Assessment:       Encounter Diagnoses   Name Primary?    Abscess of toe, right Yes    Diabetic polyneuropathy associated with type 2 diabetes mellitus     Hammertoes of both feet     Toe pain, right          Plan:       Lester was seen today for diabetes mellitus and toe pain.    Diagnoses and all orders for this visit:    Abscess of toe, right  -     Aerobic culture  -     Culture, Anaerobic    Diabetic polyneuropathy associated with type 2 diabetes mellitus    Hammertoes of both feet    Toe pain, right      I counseled the patient on his conditions, their implications and medical management.    Discussed with patient the associated risks and benefits associated with an I&D of the Rt. 2nd toe.  Verbal consent was obtained.  See attached procedure note.      Abscess cultures were obtained.    The wound base was covered with iodosorb ointment, offloaded with a foam toe sulcus roll, and a football dressing was applied.    Advised to keep the dressing CDI x 1 week.    Advised to rest and elevate the affected extremity.    Instructed to minimize weight bearing to facilitate wound healing.    Advised to ambulate only in the postoperative shoe/boot.    Discussed optimal hydration and protein consumption and how they facilitate wound healing.      RTC in 1 week.    Beka Choudhury DPM

## 2022-03-10 PROBLEM — L02.611 TOE ABSCESS, RIGHT: Status: ACTIVE | Noted: 2022-03-10

## 2022-03-10 PROBLEM — I34.0 NONRHEUMATIC MITRAL VALVE REGURGITATION: Status: ACTIVE | Noted: 2022-03-10

## 2022-03-10 PROBLEM — I50.22 CHRONIC SYSTOLIC (CONGESTIVE) HEART FAILURE: Status: ACTIVE | Noted: 2022-03-10

## 2022-03-10 PROBLEM — I25.10 CORONARY ARTERY DISEASE INVOLVING NATIVE CORONARY ARTERY OF NATIVE HEART WITHOUT ANGINA PECTORIS: Status: ACTIVE | Noted: 2022-03-10

## 2022-03-11 DIAGNOSIS — L02.611 ABSCESS OF TOE, RIGHT: Primary | ICD-10-CM

## 2022-03-11 LAB — BACTERIA SPEC AEROBE CULT: ABNORMAL

## 2022-03-14 LAB — BACTERIA SPEC ANAEROBE CULT: NORMAL

## 2022-03-16 ENCOUNTER — OFFICE VISIT (OUTPATIENT)
Dept: PODIATRY | Facility: CLINIC | Age: 72
End: 2022-03-16
Payer: MEDICARE

## 2022-03-16 VITALS — WEIGHT: 153 LBS | BODY MASS INDEX: 26.12 KG/M2 | HEIGHT: 64 IN

## 2022-03-16 DIAGNOSIS — E11.42 DIABETIC POLYNEUROPATHY ASSOCIATED WITH TYPE 2 DIABETES MELLITUS: ICD-10-CM

## 2022-03-16 DIAGNOSIS — L02.611 ABSCESS OF TOE, RIGHT: Primary | ICD-10-CM

## 2022-03-16 PROCEDURE — 1101F PT FALLS ASSESS-DOCD LE1/YR: CPT | Mod: CPTII,S$GLB,, | Performed by: PODIATRIST

## 2022-03-16 PROCEDURE — 1159F PR MEDICATION LIST DOCUMENTED IN MEDICAL RECORD: ICD-10-PCS | Mod: CPTII,S$GLB,, | Performed by: PODIATRIST

## 2022-03-16 PROCEDURE — 99999 PR PBB SHADOW E&M-EST. PATIENT-LVL II: ICD-10-PCS | Mod: PBBFAC,,, | Performed by: PODIATRIST

## 2022-03-16 PROCEDURE — 3066F PR DOCUMENTATION OF TREATMENT FOR NEPHROPATHY: ICD-10-PCS | Mod: CPTII,S$GLB,, | Performed by: PODIATRIST

## 2022-03-16 PROCEDURE — 1159F MED LIST DOCD IN RCRD: CPT | Mod: CPTII,S$GLB,, | Performed by: PODIATRIST

## 2022-03-16 PROCEDURE — 3008F BODY MASS INDEX DOCD: CPT | Mod: CPTII,S$GLB,, | Performed by: PODIATRIST

## 2022-03-16 PROCEDURE — 99999 PR PBB SHADOW E&M-EST. PATIENT-LVL II: CPT | Mod: PBBFAC,,, | Performed by: PODIATRIST

## 2022-03-16 PROCEDURE — 3288F PR FALLS RISK ASSESSMENT DOCUMENTED: ICD-10-PCS | Mod: CPTII,S$GLB,, | Performed by: PODIATRIST

## 2022-03-16 PROCEDURE — 1126F PR PAIN SEVERITY QUANTIFIED, NO PAIN PRESENT: ICD-10-PCS | Mod: CPTII,S$GLB,, | Performed by: PODIATRIST

## 2022-03-16 PROCEDURE — 1157F PR ADVANCE CARE PLAN OR EQUIV PRESENT IN MEDICAL RECORD: ICD-10-PCS | Mod: CPTII,S$GLB,, | Performed by: PODIATRIST

## 2022-03-16 PROCEDURE — 3060F POS MICROALBUMINURIA REV: CPT | Mod: CPTII,S$GLB,, | Performed by: PODIATRIST

## 2022-03-16 PROCEDURE — 3060F PR POS MICROALBUMINURIA RESULT DOCUMENTED/REVIEW: ICD-10-PCS | Mod: CPTII,S$GLB,, | Performed by: PODIATRIST

## 2022-03-16 PROCEDURE — 99024 WOUND DEBRIDEMENT: ICD-10-PCS | Mod: S$GLB,,, | Performed by: PODIATRIST

## 2022-03-16 PROCEDURE — 3288F FALL RISK ASSESSMENT DOCD: CPT | Mod: CPTII,S$GLB,, | Performed by: PODIATRIST

## 2022-03-16 PROCEDURE — 1126F AMNT PAIN NOTED NONE PRSNT: CPT | Mod: CPTII,S$GLB,, | Performed by: PODIATRIST

## 2022-03-16 PROCEDURE — 3008F PR BODY MASS INDEX (BMI) DOCUMENTED: ICD-10-PCS | Mod: CPTII,S$GLB,, | Performed by: PODIATRIST

## 2022-03-16 PROCEDURE — 3066F NEPHROPATHY DOC TX: CPT | Mod: CPTII,S$GLB,, | Performed by: PODIATRIST

## 2022-03-16 PROCEDURE — 99499 NO LOS: ICD-10-PCS | Mod: S$GLB,,, | Performed by: PODIATRIST

## 2022-03-16 PROCEDURE — 99024 POSTOP FOLLOW-UP VISIT: CPT | Mod: S$GLB,,, | Performed by: PODIATRIST

## 2022-03-16 PROCEDURE — 1157F ADVNC CARE PLAN IN RCRD: CPT | Mod: CPTII,S$GLB,, | Performed by: PODIATRIST

## 2022-03-16 PROCEDURE — 4010F ACE/ARB THERAPY RXD/TAKEN: CPT | Mod: CPTII,S$GLB,, | Performed by: PODIATRIST

## 2022-03-16 PROCEDURE — 99499 UNLISTED E&M SERVICE: CPT | Mod: S$GLB,,, | Performed by: PODIATRIST

## 2022-03-16 PROCEDURE — 4010F PR ACE/ARB THEARPY RXD/TAKEN: ICD-10-PCS | Mod: CPTII,S$GLB,, | Performed by: PODIATRIST

## 2022-03-16 PROCEDURE — 1101F PR PT FALLS ASSESS DOC 0-1 FALLS W/OUT INJ PAST YR: ICD-10-PCS | Mod: CPTII,S$GLB,, | Performed by: PODIATRIST

## 2022-03-16 NOTE — PROGRESS NOTES
Subjective:      Patient ID: Lester Carmona is a 71 y.o. male.    Chief Complaint: Wound Care (toe)  Patient presents to clinic for a one week follow up S/P I&D of the Rt. 2nd toe.  He denies experiencing pain from the site with today's exam.  Has kept the prior football dressing clean, dry, and intact x 1 week.  Notes minimal ambulation to the extremity while in the [employer] shoe.  Relates taking Cipro as instructed.  Denies experiencing N/V/F/C/D.  Denies any additional pedal complaints.      Hemoglobin A1C   Date Value Ref Range Status   07/14/2021 5.6 <5.7 % of total Hgb Final     Comment:     For the purpose of screening for the presence of  diabetes:     <5.7%       Consistent with the absence of diabetes  5.7-6.4%    Consistent with increased risk for diabetes              (prediabetes)  > or =6.5%  Consistent with diabetes     This assay result is consistent with a decreased risk  of diabetes.     Currently, no consensus exists regarding use of  hemoglobin A1c for diagnosis of diabetes in children.     According to American Diabetes Association (ADA)  guidelines, hemoglobin A1c <7.0% represents optimal  control in non-pregnant diabetic patients. Different  metrics may apply to specific patient populations.   Standards of Medical Care in Diabetes(ADA).         02/12/2021 5.4 <5.7 % of total Hgb Final     Comment:     For the purpose of screening for the presence of  diabetes:     <5.7%       Consistent with the absence of diabetes  5.7-6.4%    Consistent with increased risk for diabetes              (prediabetes)  > or =6.5%  Consistent with diabetes     This assay result is consistent with a decreased risk  of diabetes.     Currently, no consensus exists regarding use of  hemoglobin A1c for diagnosis of diabetes in children.     According to American Diabetes Association (ADA)  guidelines, hemoglobin A1c <7.0% represents optimal  control in non-pregnant diabetic patients. Different  metrics may apply to  specific patient populations.   Standards of Medical Care in Diabetes(ADA).         01/31/2020 5.7 (H) <5.7 % of total Hgb Final     Comment:     For someone without known diabetes, a hemoglobin   A1c value between 5.7% and 6.4% is consistent with  prediabetes and should be confirmed with a   follow-up test.     For someone with known diabetes, a value <7%  indicates that their diabetes is well controlled. A1c  targets should be individualized based on duration of  diabetes, age, comorbid conditions, and other  considerations.     This assay result is consistent with an increased risk  of diabetes.     Currently, no consensus exists regarding use of  hemoglobin A1c for diagnosis of diabetes for children.                Past Medical History:   Diagnosis Date    a Moderate-To-Severe Mitral Regurgitation     Dr. Olivier Romano; Dr. Alonzo flores Severe Chronic Systolic Congestive Heart Failure     Dr. Olivier Romano; 12/09/21 Inc. Lasix, RXd Potassium and Referred to Dr Juan Antonio flores Severe Multivessel Coronary Artery Disease     Dr. Olivier Romano; Dr. Alonzo larsen Chronic Hypotension     Dr. Olivier larsen Hypertension     b Microalbuminuria     On Lisinopril 2.5 Mg Daily    c Hypercholesterolemia With Low HDL     8/7/17 RXd OTC Falxseed Oil 3K Mg Daily, And D/Cd His OTC FO 2K Daily; On Lipitor 40 Mg qHS     d Type 2 Diabetes Mellitus     4/19/15 Referred To [facility] ED[facility]    f Obesity     g Mild Chronic NCNC Anemia     Am Monitoring    i H/O 1 PPD X 18 YRs TUD, Quit In 1995     i Left Sided Rib Area Pain     1/31/18 Ordered A Left Rib XRay Series    i Right Rib Area Pain After A Fall     2/17/20 Ordered A Right Rib Series    j Dysphagia ###    Dr. MANDY Diana; 7/1/16 EGD (With Empiric Dilation) = Normal Except For Gastritis (Was BXd)    j GERD With Gastritis     Dr. MANDY Diana; 7/1/16 EGD (With Empiric Dilation) = Normal Except For Gastritis (Was BXd)    j H/O Colon Polyps On 5/31/19 TC ###     "Dr. MANDY Diana: "Repeat TC In 3 Years"    k Benign Prostatic Hypertrophy     Dr. Sam Roasles    k Chronic Interstitial Cystitis ###    Dr. Sam Rosales; Elmiron Caused Bilateral Leg Pruritis    l Bilateral Knee Osteoarthritis     12/9/16 Referred To Dr. Eder Diaz    l Fall With Right Sided Pain 10/20/15     l H/O Bilateral Knee Arthroscopy Procedures     l Lumbar Spinal DDD     4/19/15 RXd Mobic 15 Mg Daily PRN; He Has Had No SX Or ESIs    m Bilateral Lower Extremity Diabetic Peripheral Neuropathy     2/13/19 RXd Neurontin 100 Mg BID    n Anxiety And Depression     o Allergic Rhinosinusitis     Dr. Sahil Burks    o Chronic Bilateral Tinnitus And Hearing Loss     Dr. Sahil Burks; Christiano Barnard (Audiologist) 6/27/16 Tested Patient And Recommended Hearing Aids    o Deviated Nasal Septum     Dr. Sahil Burks    o Poor Dentition ###    5/24/19 He Will Be Having A Complete Dental Extraction Soon (I Filled Forms Out Today)    o Recurrent Bilateral Cerumen Impactions     Dr. Sahil Burks 6/15/16 Performed Disimpaction    p OU Glaucoma     Dr. Villarreal "Arlen" Vanessa    q Bilateral Lower Extremity Pruritus ###    RTC In 2 Weeks; 5/24/19 Held Elmiron And RXd Lotrisone Cream BID; 6/3/19 BLE AA U/S = Unremarkable (See Report)    q Borderline Vitamin D Deficiency     2/10/19 RXd OTC D3 2K IU Daily    q Cherry Hemangiomas     Dr. Anthony Dorantes; Right Postauricular Hemangioma Was Resected 7/27/16    q H/O Right Buttock Hematoma 10/20/15     q Seborrheic Keratoses     Dr. Anthony Dorantes; Right Postauricular Seborrheic Keratosis Was Resected 7/27/16    Toe abscess, right     Wellness Visit 3/10/2022        Past Surgical History:   Procedure Laterality Date    CATHETERIZATION OF BOTH LEFT AND RIGHT HEART N/A 2/8/2022    Procedure: CATHETERIZATION, HEART, BOTH LEFT AND RIGHT;  Surgeon: Olivier Romano MD;  [facility];  Service: Cardiology;  Laterality: N/A;    COLONOSCOPY   "    CORONARY ANGIOGRAPHY N/A 2022    Procedure: ANGIOGRAM, CORONARY ARTERY;  Surgeon: Olivier Romano MD;  Location: [facility] CATH;  Service: Cardiology;  Laterality: N/A;    HERNIA REPAIR      KNEE SURGERY      NOSE SURGERY      TESTICLE SURGERY         Family History   Problem Relation Age of Onset    Diabetes Mother     Heart disease Mother     Hypertension Mother     COPD Mother     Arthritis Mother     Cancer Sister        Social History     Socioeconomic History    Marital status:    Tobacco Use    Smoking status: Former Smoker     Quit date:      Years since quittin.2    Smokeless tobacco: Never Used   Substance and Sexual Activity    Alcohol use: Never    Drug use: Never       Current Outpatient Medications   Medication Sig Dispense Refill    acetaminophen (TYLENOL) 325 MG tablet Take 1 tablet (325 mg total) by mouth 2 (two) times daily. (Patient taking differently: Take 325 mg by mouth once daily.) 60 tablet 3    aspirin (ECOTRIN) 81 MG EC tablet Take 1 tablet (81 mg total) by mouth once daily. 90 tablet 3    atorvastatin (LIPITOR) 40 MG tablet Take 1 tablet (40 mg total) by mouth nightly. 90 tablet 3    baclofen (LIORESAL) 10 MG tablet TAKE 1 TABLET 3 TIMES DAILYAS NEEDED 270 tablet 3    blood glucose control, low (EASYMAX LOW CONTROL) Soln 1 each by Misc.(Non-Drug; Combo Route) route before breakfast. 3 each 3    blood sugar diagnostic Strp To check BG 2 times daily, to use with insurance preferred meter 200 each 3    brimonidine 0.2% (ALPHAGAN) 0.2 % Drop 1 drop every 8 (eight) hours.      bumetanide (BUMEX) 2 MG tablet Take 1 tablet (2 mg total) by mouth once daily. 30 tablet 11    cholecalciferol, vitamin D3, (VITAMIN D3) 50 mcg (2,000 unit) Tab Take 1 tablet (2,000 Units total) by mouth once daily.      ciprofloxacin HCl (CIPRO) 500 MG tablet Take 1 tablet (500 mg total) by mouth 2 (two) times daily. for 14 days 28 tablet 0    clotrimazole-betamethasone  1-0.05% (LOTRISONE) cream Apply topically 2 (two) times daily. Apply topically to affected area bid 45 g 1    esomeprazole (NEXIUM) 40 MG capsule Take 1 capsule (40 mg total) by mouth before breakfast. 90 capsule 3    famciclovir (FAMVIR) 500 MG tablet Take 500 mg by mouth 3 (three) times daily.      finasteride (PROSCAR) 5 mg tablet Take 1 tablet (5 mg total) by mouth once daily. 90 tablet 3    gabapentin (NEURONTIN) 100 MG capsule Take 1 capsule (100 mg total) by mouth 2 (two) times daily. 180 capsule 3    ganciclovir (ZIRGAN) 0.15 % Gel Apply to eye.      Lactobacillus rhamnosus GG (CULTURELLE) 10 billion cell capsule Take 1 capsule by mouth once daily.      lancets Misc To check BG 2 times daily, to use with insurance preferred meter 200 each 3    loratadine (CLARITIN) 10 mg tablet Take 10 mg by mouth once daily.      magnesium 30 mg Tab Take 250 mg by mouth once.      meloxicam (MOBIC) 15 MG tablet Take 1 tablet (15 mg total) by mouth daily as needed. 90 tablet 3    metFORMIN (GLUCOPHAGE) 1000 MG tablet Take 1 tablet (1,000 mg total) by mouth 2 (two) times daily. 180 tablet 3    metoprolol succinate (TOPROL-XL) 25 MG 24 hr tablet Take 1 tablet (25 mg total) by mouth once daily. 30 tablet 11    mirabegron (MYRBETRIQ) 25 mg Tb24 ER tablet Take 1 tablet (25 mg total) by mouth once daily. 90 tablet 3    multivitamin capsule Take 1 capsule by mouth once daily.      olopatadine (PATANOL) 0.1 % ophthalmic solution Place 1 drop into the left eye 2 (two) times daily. 5 mL 1    potassium chloride SA (K-DUR,KLOR-CON) 10 MEQ tablet Take 1 tablet (10 mEq total) by mouth 2 (two) times daily. 60 tablet 3    psyllium (METAMUCIL) packet Take 1 packet by mouth once daily.      psyllium 0.52 gram capsule Take 0.52 g by mouth once daily.      silver sulfADIAZINE 1% (SILVADENE) 1 % cream Apply to affected area daily 50 g 1    solifenacin (VESICARE) 5 MG tablet Take 1 tablet (5 mg total) by mouth once daily. 90  tablet 3    tamsulosin (FLOMAX) 0.4 mg Cap Take 1 capsule (0.4 mg total) by mouth once daily. 90 capsule 3    travoprost (TRAVATAN Z) 0.004 % ophthalmic solution Place 1 drop into both eyes once daily. 5 mL 2     No current facility-administered medications for this visit.       Review of patient's allergies indicates:   Allergen Reactions    Elmiron [pentosan polysulfate sodium]      Bilateral Lower Extremity Pruritis         Review of Systems   Constitutional: Negative for chills and fever.   Cardiovascular: Positive for leg swelling. Negative for claudication.   Skin: Positive for color change and nail changes. Negative for dry skin and rash.   Musculoskeletal: Negative for muscle cramps, muscle weakness and myalgias.   Gastrointestinal: Negative for nausea and vomiting.   Neurological: Positive for numbness. Negative for paresthesias.   Psychiatric/Behavioral: Negative for altered mental status.           Objective:      Physical Exam  Constitutional:       Appearance: Normal appearance. He is not ill-appearing.   Cardiovascular:      Pulses:           Dorsalis pedis pulses are 2+ on the right side and 2+ on the left side.        Posterior tibial pulses are 2+ on the right side and 2+ on the left side.      Comments: CFT is < 3 seconds bilateral.  Pedal hair growth is decreased bilateral.  Varicosities noted bilateral.  Moderate nonpitting lower extremity edema noted bilateral.  Toes are cool to touch bilateral.    Musculoskeletal:         General: Deformity present. No tenderness.      Right lower leg: Edema present.      Left lower leg: Edema present.      Comments: Muscle strength 5/5 in all muscle groups bilateral.  No tenderness nor crepitation with ROM of foot/ankle joints bilateral.  (-) for pain with palpation to the wound at the distal tip of the Rt. 2nd toe.  Bilateral rigid contracture of toes 2-5.     Skin:     General: Skin is warm and dry.      Capillary Refill: Capillary refill takes 2 to 3  seconds.      Findings: Wound present. No bruising, ecchymosis, erythema, signs of injury, laceration, lesion, petechiae or rash.      Comments: Location: Wound noted to the distal tip of the Rt. 2nd toe  Base: Down to dermis and comprised of fibrin.  Drainage: None  Damari wound: Devoid of erythema, edema,and malodor.  Fluctuance and purulence noted.   Pre-debridement measurement: 0.5 x 0.2 x 1 cm.  Post-debridement measurement: 0.7 x 0.4 x 0.1cm   Neurological:      Mental Status: He is alert.      Sensory: Sensory deficit present.      Motor: No weakness or atrophy.      Comments: Protective sensation per [place] monofilament is absent bilateral.  Vibratory sensation is absent bilateral.  Light touch is absent bilateral.               Assessment:       Encounter Diagnoses   Name Primary?    Abscess of toe, right Yes    Diabetic polyneuropathy associated with type 2 diabetes mellitus          Plan:       Lester was seen today for wound care.    Diagnoses and all orders for this visit:    Abscess of toe, right  -     Wound Debridement    Diabetic polyneuropathy associated with type 2 diabetes mellitus      I counseled the patient on his conditions, their implications and medical management.    Performed a selective excisional debridement of the Rt. 2nd toe. See attached procedure note.      The wound base was covered with clarke, offloaded with a foam toe sulcus roll, and a football dressing was applied.    Advised to keep the dressing CDI x 1 week.    Advised to rest and elevate the affected extremity.    Instructed to minimize weight bearing to facilitate wound healing.    Advised to ambulate only in the postoperative shoe/boot.    Patient to finish the current course of cipro.    RTC in 1 week.    Beka Choudhury DPM

## 2022-03-16 NOTE — PROCEDURES
Wound Debridement    Date/Time: 3/16/2022 9:40 AM  Performed by: Beka Choudhury DPM  Authorized by: Beak Choudhury DPM     Consent Done?:  Yes (Verbal)    Preparation: Patient was prepped and draped in usual sterile fashion    Local anesthesia used?: No      Wound Details:    Location:  Right foot    Location:  Right 2nd Toe    Type of Debridement:  Excisional       Length (cm):  0.5       Area (sq cm):  0.1       Width (cm):  0.2       Percent Debrided (%):  100       Depth (cm):  0.1       Total Area Debrided (sq cm):  0.1    Depth of debridement:  Epidermis/Dermis    Tissue debrided:  Dermis    Devitalized tissue debrided:  Fibrin    Instruments:  Blade    Bleeding:  Minimal  Hemostasis Achieved: Yes    Method Used:  Pressure  Patient tolerance:  Patient tolerated the procedure well with no immediate complications

## 2022-03-23 ENCOUNTER — OFFICE VISIT (OUTPATIENT)
Dept: PODIATRY | Facility: CLINIC | Age: 72
End: 2022-03-23
Payer: MEDICARE

## 2022-03-23 VITALS — WEIGHT: 153 LBS | BODY MASS INDEX: 26.12 KG/M2 | HEIGHT: 64 IN

## 2022-03-23 DIAGNOSIS — M20.41 HAMMERTOES OF BOTH FEET: ICD-10-CM

## 2022-03-23 DIAGNOSIS — L97.511 DIABETIC ULCER OF TOE OF RIGHT FOOT ASSOCIATED WITH TYPE 2 DIABETES MELLITUS, LIMITED TO BREAKDOWN OF SKIN: Primary | ICD-10-CM

## 2022-03-23 DIAGNOSIS — E11.42 DIABETIC POLYNEUROPATHY ASSOCIATED WITH TYPE 2 DIABETES MELLITUS: ICD-10-CM

## 2022-03-23 DIAGNOSIS — M20.42 HAMMERTOES OF BOTH FEET: ICD-10-CM

## 2022-03-23 DIAGNOSIS — E11.621 DIABETIC ULCER OF TOE OF RIGHT FOOT ASSOCIATED WITH TYPE 2 DIABETES MELLITUS, LIMITED TO BREAKDOWN OF SKIN: Primary | ICD-10-CM

## 2022-03-23 PROCEDURE — 1159F PR MEDICATION LIST DOCUMENTED IN MEDICAL RECORD: ICD-10-PCS | Mod: CPTII,S$GLB,, | Performed by: PODIATRIST

## 2022-03-23 PROCEDURE — 3288F FALL RISK ASSESSMENT DOCD: CPT | Mod: CPTII,S$GLB,, | Performed by: PODIATRIST

## 2022-03-23 PROCEDURE — 3288F PR FALLS RISK ASSESSMENT DOCUMENTED: ICD-10-PCS | Mod: CPTII,S$GLB,, | Performed by: PODIATRIST

## 2022-03-23 PROCEDURE — 3060F POS MICROALBUMINURIA REV: CPT | Mod: CPTII,S$GLB,, | Performed by: PODIATRIST

## 2022-03-23 PROCEDURE — 3066F PR DOCUMENTATION OF TREATMENT FOR NEPHROPATHY: ICD-10-PCS | Mod: CPTII,S$GLB,, | Performed by: PODIATRIST

## 2022-03-23 PROCEDURE — 99999 PR PBB SHADOW E&M-EST. PATIENT-LVL II: CPT | Mod: PBBFAC,,, | Performed by: PODIATRIST

## 2022-03-23 PROCEDURE — 99499 UNLISTED E&M SERVICE: CPT | Mod: S$GLB,,, | Performed by: PODIATRIST

## 2022-03-23 PROCEDURE — 1157F PR ADVANCE CARE PLAN OR EQUIV PRESENT IN MEDICAL RECORD: ICD-10-PCS | Mod: CPTII,S$GLB,, | Performed by: PODIATRIST

## 2022-03-23 PROCEDURE — 3066F NEPHROPATHY DOC TX: CPT | Mod: CPTII,S$GLB,, | Performed by: PODIATRIST

## 2022-03-23 PROCEDURE — 99499 NO LOS: ICD-10-PCS | Mod: S$GLB,,, | Performed by: PODIATRIST

## 2022-03-23 PROCEDURE — 4010F ACE/ARB THERAPY RXD/TAKEN: CPT | Mod: CPTII,S$GLB,, | Performed by: PODIATRIST

## 2022-03-23 PROCEDURE — 97597 DBRDMT OPN WND 1ST 20 CM/<: CPT | Mod: S$GLB,,, | Performed by: PODIATRIST

## 2022-03-23 PROCEDURE — 97597 WOUND DEBRIDEMENT: ICD-10-PCS | Mod: S$GLB,,, | Performed by: PODIATRIST

## 2022-03-23 PROCEDURE — 1101F PR PT FALLS ASSESS DOC 0-1 FALLS W/OUT INJ PAST YR: ICD-10-PCS | Mod: CPTII,S$GLB,, | Performed by: PODIATRIST

## 2022-03-23 PROCEDURE — 4010F PR ACE/ARB THEARPY RXD/TAKEN: ICD-10-PCS | Mod: CPTII,S$GLB,, | Performed by: PODIATRIST

## 2022-03-23 PROCEDURE — 1126F PR PAIN SEVERITY QUANTIFIED, NO PAIN PRESENT: ICD-10-PCS | Mod: CPTII,S$GLB,, | Performed by: PODIATRIST

## 2022-03-23 PROCEDURE — 1157F ADVNC CARE PLAN IN RCRD: CPT | Mod: CPTII,S$GLB,, | Performed by: PODIATRIST

## 2022-03-23 PROCEDURE — 3008F BODY MASS INDEX DOCD: CPT | Mod: CPTII,S$GLB,, | Performed by: PODIATRIST

## 2022-03-23 PROCEDURE — 1159F MED LIST DOCD IN RCRD: CPT | Mod: CPTII,S$GLB,, | Performed by: PODIATRIST

## 2022-03-23 PROCEDURE — 3008F PR BODY MASS INDEX (BMI) DOCUMENTED: ICD-10-PCS | Mod: CPTII,S$GLB,, | Performed by: PODIATRIST

## 2022-03-23 PROCEDURE — 1126F AMNT PAIN NOTED NONE PRSNT: CPT | Mod: CPTII,S$GLB,, | Performed by: PODIATRIST

## 2022-03-23 PROCEDURE — 3060F PR POS MICROALBUMINURIA RESULT DOCUMENTED/REVIEW: ICD-10-PCS | Mod: CPTII,S$GLB,, | Performed by: PODIATRIST

## 2022-03-23 PROCEDURE — 99999 PR PBB SHADOW E&M-EST. PATIENT-LVL II: ICD-10-PCS | Mod: PBBFAC,,, | Performed by: PODIATRIST

## 2022-03-23 PROCEDURE — 1101F PT FALLS ASSESS-DOCD LE1/YR: CPT | Mod: CPTII,S$GLB,, | Performed by: PODIATRIST

## 2022-03-23 NOTE — PROGRESS NOTES
Subjective:      Patient ID: Lester Carmona is a 71 y.o. male.    Chief Complaint: Wound Care and Foot Pain (Left foot shooting pains a couple days ago p.s. 10)  Patient presents to clinic 2 weeks S/P I&D of the Rt. 2nd toe.  Denies pain from the open wound with today's exam.  Has kept the prior football dressing clean, dry, and intact x 1 week.  Notes minimal ambulation to the extremity while in the DAR[employer] shoe.  Denies experiencing N/V/F/C/D.  Has noted sporadic shooting/burning pain in the Lt. Foot nocturnally, however, denies this being an issue with ambulation.  Denies any additional pedal complaints.      Hemoglobin A1C   Date Value Ref Range Status   07/14/2021 5.6 <5.7 % of total Hgb Final     Comment:     For the purpose of screening for the presence of  diabetes:     <5.7%       Consistent with the absence of diabetes  5.7-6.4%    Consistent with increased risk for diabetes              (prediabetes)  > or =6.5%  Consistent with diabetes     This assay result is consistent with a decreased risk  of diabetes.     Currently, no consensus exists regarding use of  hemoglobin A1c for diagnosis of diabetes in children.     According to American Diabetes Association (ADA)  guidelines, hemoglobin A1c <7.0% represents optimal  control in non-pregnant diabetic patients. Different  metrics may apply to specific patient populations.   Standards of Medical Care in Diabetes(ADA).         02/12/2021 5.4 <5.7 % of total Hgb Final     Comment:     For the purpose of screening for the presence of  diabetes:     <5.7%       Consistent with the absence of diabetes  5.7-6.4%    Consistent with increased risk for diabetes              (prediabetes)  > or =6.5%  Consistent with diabetes     This assay result is consistent with a decreased risk  of diabetes.     Currently, no consensus exists regarding use of  hemoglobin A1c for diagnosis of diabetes in children.     According to American Diabetes Association (ADA)  guidelines,  hemoglobin A1c <7.0% represents optimal  control in non-pregnant diabetic patients. Different  metrics may apply to specific patient populations.   Standards of Medical Care in Diabetes(ADA).         01/31/2020 5.7 (H) <5.7 % of total Hgb Final     Comment:     For someone without known diabetes, a hemoglobin   A1c value between 5.7% and 6.4% is consistent with  prediabetes and should be confirmed with a   follow-up test.     For someone with known diabetes, a value <7%  indicates that their diabetes is well controlled. A1c  targets should be individualized based on duration of  diabetes, age, comorbid conditions, and other  considerations.     This assay result is consistent with an increased risk  of diabetes.     Currently, no consensus exists regarding use of  hemoglobin A1c for diagnosis of diabetes for children.                Past Medical History:   Diagnosis Date    a Moderate-To-Severe Mitral Regurgitation     Dr. Olivier Romano; Dr. Alonzo flores Severe Chronic Systolic Congestive Heart Failure     Dr. Olivier Romano; 12/09/21 Inc. Lasix, RXd Potassium and Referred to Dr Romano    a Severe Multivessel Coronary Artery Disease     Dr. Olivier Romano; Dr. Alonzo Rubio    b Chronic Hypotension     Dr. Olivier Romano    b Hypertension     b Microalbuminuria     On Lisinopril 2.5 Mg Daily    c Hypercholesterolemia With Low HDL     8/7/17 RXd OTC Falxseed Oil 3K Mg Daily, And D/Cd His OTC FO 2K Daily; On Lipitor 40 Mg qHS     d Type 2 Diabetes Mellitus     4/19/15 Referred To [facility]    f Obesity     g Mild Chronic NCNC Anemia     Am Monitoring    i H/O 1 PPD X 18 YRs TUD, Quit In 1995     i Left Sided Rib Area Pain     1/31/18 Ordered A Left Rib XRay Series    i Right Rib Area Pain After A Fall     2/17/20 Ordered A Right Rib Series    j Dysphagia ###    Dr. MANDY Diana; 7/1/16 EGD (With Empiric Dilation) = Normal Except For Gastritis (Was BXd)    j GERD With Gastritis     Dr. MANDY Diana;  "7/1/16 EGD (With Empiric Dilation) = Normal Except For Gastritis (Was BXd)    j H/O Colon Polyps On 5/31/19 TC ###    Dr. MANDY Diana: "Repeat TC In 3 Years"    k Benign Prostatic Hypertrophy     Dr. Sam Rosales    k Chronic Interstitial Cystitis ###    Dr. Sam Rosales; Elmiron Caused Bilateral Leg Pruritis    l Bilateral Knee Osteoarthritis     12/9/16 Referred To Dr. Eder ugalde Fall With Right Sided Pain 10/20/15     l H/O Bilateral Knee Arthroscopy Procedures     l Lumbar Spinal DDD     4/19/15 RXd Mobic 15 Mg Daily PRN; He Has Had No SX Or ESIs    m Bilateral Lower Extremity Diabetic Peripheral Neuropathy     2/13/19 RXd Neurontin 100 Mg BID    n Anxiety And Depression     o Allergic Rhinosinusitis     Dr. Sahil Burks    o Chronic Bilateral Tinnitus And Hearing Loss     Dr. Sahil Burks; Christiano Barnard (Audiologist) 6/27/16 Tested Patient And Recommended Hearing Aids    o Deviated Nasal Septum     Dr. Sahil Burks    o Poor Dentition ###    5/24/19 He Will Be Having A Complete Dental Extraction Soon (I Filled Forms Out Today)    o Recurrent Bilateral Cerumen Impactions     Dr. Sahil Burks 6/15/16 Performed Disimpaction    p OU Glaucoma     Dr. Villarreal "Arlen" Vanessa    q Bilateral Lower Extremity Pruritus ###    RTC In 2 Weeks; 5/24/19 Held Elmiron And RXd Lotrisone Cream BID; 6/3/19 BLE AA U/S = Unremarkable (See Report)    q Borderline Vitamin D Deficiency     2/10/19 RXd OTC D3 2K IU Daily    q Cherry Hemangiomas     Dr. Anthony Dorantes; Right Postauricular Hemangioma Was Resected 7/27/16    q H/O Right Buttock Hematoma 10/20/15     q Seborrheic Keratoses     Dr. Anthony Dorantes; Right Postauricular Seborrheic Keratosis Was Resected 7/27/16    Toe abscess, right     Wellness Visit 3/10/2022        Past Surgical History:   Procedure Laterality Date    CATHETERIZATION OF BOTH LEFT AND RIGHT HEART N/A 2/8/2022    Procedure: CATHETERIZATION, HEART, BOTH LEFT AND " RIGHT;  Surgeon: Olivier Romano MD;  Location: [facility] CATH;  Service: Cardiology;  Laterality: N/A;    COLONOSCOPY      CORONARY ANGIOGRAPHY N/A 2022    Procedure: ANGIOGRAM, CORONARY ARTERY;  Surgeon: Olivier Romano MD;  Location: [facility] CATH;  Service: Cardiology;  Laterality: N/A;    HERNIA REPAIR      KNEE SURGERY      NOSE SURGERY      TESTICLE SURGERY         Family History   Problem Relation Age of Onset    Diabetes Mother     Heart disease Mother     Hypertension Mother     COPD Mother     Arthritis Mother     Cancer Sister        Social History     Socioeconomic History    Marital status:    Tobacco Use    Smoking status: Former Smoker     Quit date:      Years since quittin.2    Smokeless tobacco: Never Used   Substance and Sexual Activity    Alcohol use: Never    Drug use: Never       Current Outpatient Medications   Medication Sig Dispense Refill    acetaminophen (TYLENOL) 325 MG tablet Take 1 tablet (325 mg total) by mouth 2 (two) times daily. (Patient taking differently: Take 325 mg by mouth once daily.) 60 tablet 3    aspirin (ECOTRIN) 81 MG EC tablet Take 1 tablet (81 mg total) by mouth once daily. 90 tablet 3    atorvastatin (LIPITOR) 40 MG tablet Take 1 tablet (40 mg total) by mouth nightly. 90 tablet 3    baclofen (LIORESAL) 10 MG tablet TAKE 1 TABLET 3 TIMES DAILYAS NEEDED 270 tablet 3    blood glucose control, low (EASYMAX LOW CONTROL) Soln 1 each by Misc.(Non-Drug; Combo Route) route before breakfast. 3 each 3    blood sugar diagnostic Strp To check BG 2 times daily, to use with insurance preferred meter 200 each 3    brimonidine 0.2% (ALPHAGAN) 0.2 % Drop 1 drop every 8 (eight) hours.      bumetanide (BUMEX) 2 MG tablet Take 1 tablet (2 mg total) by mouth once daily. 30 tablet 11    cholecalciferol, vitamin D3, (VITAMIN D3) 50 mcg (2,000 unit) Tab Take 1 tablet (2,000 Units total) by mouth once daily.      ciprofloxacin HCl (CIPRO) 500 MG tablet  Take 1 tablet (500 mg total) by mouth 2 (two) times daily. for 14 days 28 tablet 0    clotrimazole-betamethasone 1-0.05% (LOTRISONE) cream Apply topically 2 (two) times daily. Apply topically to affected area bid 45 g 1    esomeprazole (NEXIUM) 40 MG capsule Take 1 capsule (40 mg total) by mouth before breakfast. 90 capsule 3    famciclovir (FAMVIR) 500 MG tablet Take 500 mg by mouth 3 (three) times daily.      finasteride (PROSCAR) 5 mg tablet Take 1 tablet (5 mg total) by mouth once daily. 90 tablet 3    gabapentin (NEURONTIN) 100 MG capsule Take 1 capsule (100 mg total) by mouth 2 (two) times daily. 180 capsule 3    ganciclovir (ZIRGAN) 0.15 % Gel Apply to eye.      Lactobacillus rhamnosus GG (CULTURELLE) 10 billion cell capsule Take 1 capsule by mouth once daily.      lancets Misc To check BG 2 times daily, to use with insurance preferred meter 200 each 3    loratadine (CLARITIN) 10 mg tablet Take 10 mg by mouth once daily.      magnesium 30 mg Tab Take 250 mg by mouth once.      meloxicam (MOBIC) 15 MG tablet Take 1 tablet (15 mg total) by mouth daily as needed. 90 tablet 3    metFORMIN (GLUCOPHAGE) 1000 MG tablet Take 1 tablet (1,000 mg total) by mouth 2 (two) times daily. 180 tablet 3    metoprolol succinate (TOPROL-XL) 25 MG 24 hr tablet Take 1 tablet (25 mg total) by mouth once daily. 30 tablet 11    mirabegron (MYRBETRIQ) 25 mg Tb24 ER tablet Take 1 tablet (25 mg total) by mouth once daily. 90 tablet 3    multivitamin capsule Take 1 capsule by mouth once daily.      olopatadine (PATANOL) 0.1 % ophthalmic solution Place 1 drop into the left eye 2 (two) times daily. 5 mL 1    potassium chloride SA (K-DUR,KLOR-CON) 10 MEQ tablet Take 1 tablet (10 mEq total) by mouth 2 (two) times daily. 60 tablet 3    psyllium (METAMUCIL) packet Take 1 packet by mouth once daily.      psyllium 0.52 gram capsule Take 0.52 g by mouth once daily.      silver sulfADIAZINE 1% (SILVADENE) 1 % cream Apply to  affected area daily 50 g 1    solifenacin (VESICARE) 5 MG tablet Take 1 tablet (5 mg total) by mouth once daily. 90 tablet 3    tamsulosin (FLOMAX) 0.4 mg Cap Take 1 capsule (0.4 mg total) by mouth once daily. 90 capsule 3    travoprost (TRAVATAN Z) 0.004 % ophthalmic solution Place 1 drop into both eyes once daily. 5 mL 2     No current facility-administered medications for this visit.       Review of patient's allergies indicates:   Allergen Reactions    Elmiron [pentosan polysulfate sodium]      Bilateral Lower Extremity Pruritis         Review of Systems   Constitutional: Negative for chills and fever.   Cardiovascular: Positive for leg swelling. Negative for claudication.   Skin: Positive for color change and nail changes. Negative for dry skin and rash.   Musculoskeletal: Negative for muscle cramps, muscle weakness and myalgias.   Gastrointestinal: Negative for nausea and vomiting.   Neurological: Positive for numbness. Negative for paresthesias.   Psychiatric/Behavioral: Negative for altered mental status.           Objective:      Physical Exam  Constitutional:       Appearance: Normal appearance. He is not ill-appearing.   Cardiovascular:      Pulses:           Dorsalis pedis pulses are 2+ on the right side and 2+ on the left side.        Posterior tibial pulses are 2+ on the right side and 2+ on the left side.      Comments: CFT is < 3 seconds bilateral.  Pedal hair growth is decreased bilateral.  Varicosities noted bilateral.  Moderate nonpitting lower extremity edema noted bilateral.  Toes are cool to touch bilateral.    Musculoskeletal:         General: Deformity present. No tenderness.      Right lower leg: Edema present.      Left lower leg: Edema present.      Comments: Muscle strength 5/5 in all muscle groups bilateral.  No tenderness nor crepitation with ROM of foot/ankle joints bilateral.  (-) for pain with palpation to the wound at the distal tip of the Rt. 2nd toe.  Bilateral rigid  contracture of toes 2-5.     Skin:     General: Skin is warm and dry.      Capillary Refill: Capillary refill takes 2 to 3 seconds.      Findings: Wound present. No bruising, ecchymosis, erythema, signs of injury, laceration, lesion, petechiae or rash.      Comments: Location: Wound noted to the distal tip of the Rt. 2nd toe  Base: Down to dermis and comprised of fibrin.  Drainage: None  Damari wound: Devoid of erythema, edema,and malodor.  Fluctuance and purulence noted.   Pre-debridement measurement: 0.3 x 0.1 x 0.1 cm.  Post-debridement measurement: 0.5 x 0.3 x 0.1cm   Neurological:      Mental Status: He is alert.      Sensory: Sensory deficit present.      Motor: No weakness or atrophy.      Comments: Protective sensation per [place] monofilament is absent bilateral.  Vibratory sensation is absent bilateral.  Light touch is absent bilateral.               Assessment:       Encounter Diagnoses   Name Primary?    Diabetic ulcer of toe of right foot associated with type 2 diabetes mellitus, limited to breakdown of skin Yes    Diabetic polyneuropathy associated with type 2 diabetes mellitus     Hammertoes of both feet          Plan:       Lester was seen today for wound care and foot pain.    Diagnoses and all orders for this visit:    Diabetic ulcer of toe of right foot associated with type 2 diabetes mellitus, limited to breakdown of skin  -     Wound Debridement    Diabetic polyneuropathy associated with type 2 diabetes mellitus    Hammertoes of both feet      I counseled the patient on his conditions, their implications and medical management.    Performed a selective excisional debridement of the Rt. 2nd toe. See attached procedure note.      The wound base was covered with clrake, offloaded with a foam toe sulcus roll, and a football dressing was applied.    Advised to keep the dressing CDI x 1 week.    Advised to rest and elevate the affected extremity.    Instructed to minimize weight bearing to  facilitate wound healing.    Advised to ambulate only in the postoperative shoe/boot.    RTC in 1 week.    Beka Choudhury DPM

## 2022-03-23 NOTE — PROCEDURES
Wound Debridement    Date/Time: 3/23/2022 10:00 AM  Performed by: Beka Choudhury DPM  Authorized by: Bkea Choudhury DPM     Consent Done?:  Yes (Verbal)    Preparation: Patient was prepped and draped in usual sterile fashion    Local anesthesia used?: No      Wound Details:    Location:  Right foot    Location:  Right 2nd Toe    Type of Debridement:  Excisional       Length (cm):  0.3       Area (sq cm):  0.03       Width (cm):  0.1       Percent Debrided (%):  100       Depth (cm):  0.1       Total Area Debrided (sq cm):  0.03    Depth of debridement:  Epidermis/Dermis    Tissue debrided:  Dermis    Devitalized tissue debrided:  Fibrin    Instruments:  Blade    Bleeding:  Minimal  Hemostasis Achieved: Yes    Method Used:  Pressure  Patient tolerance:  Patient tolerated the procedure well with no immediate complications

## 2022-03-30 ENCOUNTER — OFFICE VISIT (OUTPATIENT)
Dept: PODIATRY | Facility: CLINIC | Age: 72
End: 2022-03-30
Payer: MEDICARE

## 2022-03-30 VITALS — HEIGHT: 64 IN | BODY MASS INDEX: 26.12 KG/M2 | WEIGHT: 153 LBS

## 2022-03-30 DIAGNOSIS — L97.511 DIABETIC ULCER OF TOE OF RIGHT FOOT ASSOCIATED WITH TYPE 2 DIABETES MELLITUS, LIMITED TO BREAKDOWN OF SKIN: Primary | ICD-10-CM

## 2022-03-30 DIAGNOSIS — M20.42 HAMMERTOES OF BOTH FEET: ICD-10-CM

## 2022-03-30 DIAGNOSIS — E11.42 DIABETIC POLYNEUROPATHY ASSOCIATED WITH TYPE 2 DIABETES MELLITUS: ICD-10-CM

## 2022-03-30 DIAGNOSIS — M20.41 HAMMERTOES OF BOTH FEET: ICD-10-CM

## 2022-03-30 DIAGNOSIS — E11.621 DIABETIC ULCER OF TOE OF RIGHT FOOT ASSOCIATED WITH TYPE 2 DIABETES MELLITUS, LIMITED TO BREAKDOWN OF SKIN: Primary | ICD-10-CM

## 2022-03-30 PROCEDURE — 1157F PR ADVANCE CARE PLAN OR EQUIV PRESENT IN MEDICAL RECORD: ICD-10-PCS | Mod: CPTII,S$GLB,, | Performed by: PODIATRIST

## 2022-03-30 PROCEDURE — 1126F PR PAIN SEVERITY QUANTIFIED, NO PAIN PRESENT: ICD-10-PCS | Mod: CPTII,S$GLB,, | Performed by: PODIATRIST

## 2022-03-30 PROCEDURE — 3060F PR POS MICROALBUMINURIA RESULT DOCUMENTED/REVIEW: ICD-10-PCS | Mod: CPTII,S$GLB,, | Performed by: PODIATRIST

## 2022-03-30 PROCEDURE — 1157F ADVNC CARE PLAN IN RCRD: CPT | Mod: CPTII,S$GLB,, | Performed by: PODIATRIST

## 2022-03-30 PROCEDURE — 99499 NO LOS: ICD-10-PCS | Mod: S$GLB,,, | Performed by: PODIATRIST

## 2022-03-30 PROCEDURE — 3008F BODY MASS INDEX DOCD: CPT | Mod: CPTII,S$GLB,, | Performed by: PODIATRIST

## 2022-03-30 PROCEDURE — 3060F POS MICROALBUMINURIA REV: CPT | Mod: CPTII,S$GLB,, | Performed by: PODIATRIST

## 2022-03-30 PROCEDURE — 97597 WOUND DEBRIDEMENT: ICD-10-PCS | Mod: S$GLB,,, | Performed by: PODIATRIST

## 2022-03-30 PROCEDURE — 3066F PR DOCUMENTATION OF TREATMENT FOR NEPHROPATHY: ICD-10-PCS | Mod: CPTII,S$GLB,, | Performed by: PODIATRIST

## 2022-03-30 PROCEDURE — 3066F NEPHROPATHY DOC TX: CPT | Mod: CPTII,S$GLB,, | Performed by: PODIATRIST

## 2022-03-30 PROCEDURE — 99499 UNLISTED E&M SERVICE: CPT | Mod: S$GLB,,, | Performed by: PODIATRIST

## 2022-03-30 PROCEDURE — 97597 DBRDMT OPN WND 1ST 20 CM/<: CPT | Mod: S$GLB,,, | Performed by: PODIATRIST

## 2022-03-30 PROCEDURE — 99999 PR PBB SHADOW E&M-EST. PATIENT-LVL II: CPT | Mod: PBBFAC,,, | Performed by: PODIATRIST

## 2022-03-30 PROCEDURE — 1159F PR MEDICATION LIST DOCUMENTED IN MEDICAL RECORD: ICD-10-PCS | Mod: CPTII,S$GLB,, | Performed by: PODIATRIST

## 2022-03-30 PROCEDURE — 4010F ACE/ARB THERAPY RXD/TAKEN: CPT | Mod: CPTII,S$GLB,, | Performed by: PODIATRIST

## 2022-03-30 PROCEDURE — 1126F AMNT PAIN NOTED NONE PRSNT: CPT | Mod: CPTII,S$GLB,, | Performed by: PODIATRIST

## 2022-03-30 PROCEDURE — 3008F PR BODY MASS INDEX (BMI) DOCUMENTED: ICD-10-PCS | Mod: CPTII,S$GLB,, | Performed by: PODIATRIST

## 2022-03-30 PROCEDURE — 99999 PR PBB SHADOW E&M-EST. PATIENT-LVL II: ICD-10-PCS | Mod: PBBFAC,,, | Performed by: PODIATRIST

## 2022-03-30 PROCEDURE — 1159F MED LIST DOCD IN RCRD: CPT | Mod: CPTII,S$GLB,, | Performed by: PODIATRIST

## 2022-03-30 PROCEDURE — 4010F PR ACE/ARB THEARPY RXD/TAKEN: ICD-10-PCS | Mod: CPTII,S$GLB,, | Performed by: PODIATRIST

## 2022-03-30 NOTE — PROGRESS NOTES
Subjective:      Patient ID: Lester Carmona is a 71 y.o. male.    Chief Complaint: Wound Care  Patient presents to clinic 3 weeks S/P I&D of the Rt. 2nd toe.  Denies pain from the open wound with today's exam.  Has kept the prior football dressing clean, dry, and intact x 1 week.  Notes minimal ambulation to the extremity while in the [employer] shoe.  Denies experiencing N/V/F/C/D.  Denies any additional pedal complaints.      Hemoglobin A1C   Date Value Ref Range Status   07/14/2021 5.6 <5.7 % of total Hgb Final     Comment:     For the purpose of screening for the presence of  diabetes:     <5.7%       Consistent with the absence of diabetes  5.7-6.4%    Consistent with increased risk for diabetes              (prediabetes)  > or =6.5%  Consistent with diabetes     This assay result is consistent with a decreased risk  of diabetes.     Currently, no consensus exists regarding use of  hemoglobin A1c for diagnosis of diabetes in children.     According to American Diabetes Association (ADA)  guidelines, hemoglobin A1c <7.0% represents optimal  control in non-pregnant diabetic patients. Different  metrics may apply to specific patient populations.   Standards of Medical Care in Diabetes(ADA).         02/12/2021 5.4 <5.7 % of total Hgb Final     Comment:     For the purpose of screening for the presence of  diabetes:     <5.7%       Consistent with the absence of diabetes  5.7-6.4%    Consistent with increased risk for diabetes              (prediabetes)  > or =6.5%  Consistent with diabetes     This assay result is consistent with a decreased risk  of diabetes.     Currently, no consensus exists regarding use of  hemoglobin A1c for diagnosis of diabetes in children.     According to American Diabetes Association (ADA)  guidelines, hemoglobin A1c <7.0% represents optimal  control in non-pregnant diabetic patients. Different  metrics may apply to specific patient populations.   Standards of Medical Care in  "Diabetes(ADA).         01/31/2020 5.7 (H) <5.7 % of total Hgb Final     Comment:     For someone without known diabetes, a hemoglobin   A1c value between 5.7% and 6.4% is consistent with  prediabetes and should be confirmed with a   follow-up test.     For someone with known diabetes, a value <7%  indicates that their diabetes is well controlled. A1c  targets should be individualized based on duration of  diabetes, age, comorbid conditions, and other  considerations.     This assay result is consistent with an increased risk  of diabetes.     Currently, no consensus exists regarding use of  hemoglobin A1c for diagnosis of diabetes for children.                Past Medical History:   Diagnosis Date    a Moderate-To-Severe Mitral Regurgitation     Dr. Olivier Romano; Dr. Alonzo flores Severe Chronic Systolic Congestive Heart Failure     Dr. Olivier Romano; 12/09/21 Inc. Lasix, RXd Potassium and Referred to Dr Juan Antonio flores Severe Multivessel Coronary Artery Disease     Dr. Olivier Romano; Dr. Alonzo larsen Chronic Hypotension     Dr. Olivier larsen Hypertension     b Microalbuminuria     On Lisinopril 2.5 Mg Daily    c Hypercholesterolemia With Low HDL     8/7/17 RXd OTC Falxseed Oil 3K Mg Daily, And D/Cd His OTC FO 2K Daily; On Lipitor 40 Mg qHS     d Type 2 Diabetes Mellitus     4/19/15 Referred To [facility] EDU    f Obesity     g Mild Chronic NCNC Anemia     Am Monitoring    i H/O 1 PPD X 18 YRs TUD, Quit In 1995     i Left Sided Rib Area Pain     1/31/18 Ordered A Left Rib XRay Series    i Right Rib Area Pain After A Fall     2/17/20 Ordered A Right Rib Series    j Dysphagia ###    Dr. MANDY Diana; 7/1/16 EGD (With Empiric Dilation) = Normal Except For Gastritis (Was BXd)    j GERD With Gastritis     Dr. MANDY Diana; 7/1/16 EGD (With Empiric Dilation) = Normal Except For Gastritis (Was BXd)    j H/O Colon Polyps On 5/31/19 TC ###    Dr. MANDY Diana: "Repeat TC In 3 Years"    k Benign " "Prostatic Hypertrophy     Dr. Sam garrett Chronic Interstitial Cystitis ###    Dr. Sam Rosales; Elmiron Caused Bilateral Leg Pruritis    l Bilateral Knee Osteoarthritis     12/9/16 Referred To Dr. Eder ugalde Fall With Right Sided Pain 10/20/15     l H/O Bilateral Knee Arthroscopy Procedures     l Lumbar Spinal DDD     4/19/15 RXd Mobic 15 Mg Daily PRN; He Has Had No SX Or ESIs    m Bilateral Lower Extremity Diabetic Peripheral Neuropathy     2/13/19 RXd Neurontin 100 Mg BID    n Anxiety And Depression     o Allergic Rhinosinusitis     Dr. Sahil Burks    o Chronic Bilateral Tinnitus And Hearing Loss     Dr. Sahil Burks; Christiano Barnard (Audiologist) 6/27/16 Tested Patient And Recommended Hearing Aids    o Deviated Nasal Septum     Dr. Sahil Burks    o Poor Dentition ###    5/24/19 He Will Be Having A Complete Dental Extraction Soon (I Filled Forms Out Today)    o Recurrent Bilateral Cerumen Impactions     Dr. Sahil Burks 6/15/16 Performed Disimpaction    p OU Glaucoma     Dr. Villarreal "Leroyre" Vanessa    q Bilateral Lower Extremity Pruritus ###    RTC In 2 Weeks; 5/24/19 Held Elmiron And RXd Lotrisone Cream BID; 6/3/19 BLE AA U/S = Unremarkable (See Report)    q Borderline Vitamin D Deficiency     2/10/19 RXd OTC D3 2K IU Daily    q Cherry Hemangiomas     Dr. Anthony Dorantes; Right Postauricular Hemangioma Was Resected 7/27/16    q H/O Right Buttock Hematoma 10/20/15     q Seborrheic Keratoses     Dr. Anthony Dorantes; Right Postauricular Seborrheic Keratosis Was Resected 7/27/16    Toe abscess, right     Wellness Visit 3/10/2022        Past Surgical History:   Procedure Laterality Date    CATHETERIZATION OF BOTH LEFT AND RIGHT HEART N/A 2/8/2022    Procedure: CATHETERIZATION, HEART, BOTH LEFT AND RIGHT;  Surgeon: Olivier Romano MD;  Location: [facility];  Service: Cardiology;  Laterality: N/A;    COLONOSCOPY      CORONARY ANGIOGRAPHY N/A 2/8/2022    Procedure: ANGIOGRAM, " CORONARY ARTERY;  Surgeon: Olivier Romano MD;  Location: [facility];  Service: Cardiology;  Laterality: N/A;    HERNIA REPAIR      KNEE SURGERY      NOSE SURGERY      TESTICLE SURGERY         Family History   Problem Relation Age of Onset    Diabetes Mother     Heart disease Mother     Hypertension Mother     COPD Mother     Arthritis Mother     Cancer Sister        Social History     Socioeconomic History    Marital status:    Tobacco Use    Smoking status: Former Smoker     Quit date:      Years since quittin.2    Smokeless tobacco: Never Used   Substance and Sexual Activity    Alcohol use: Never    Drug use: Never       Current Outpatient Medications   Medication Sig Dispense Refill    acetaminophen (TYLENOL) 325 MG tablet Take 1 tablet (325 mg total) by mouth 2 (two) times daily. (Patient taking differently: Take 325 mg by mouth once daily.) 60 tablet 3    aspirin (ECOTRIN) 81 MG EC tablet Take 1 tablet (81 mg total) by mouth once daily. 90 tablet 3    atorvastatin (LIPITOR) 40 MG tablet Take 1 tablet (40 mg total) by mouth nightly. 90 tablet 3    baclofen (LIORESAL) 10 MG tablet TAKE 1 TABLET 3 TIMES DAILYAS NEEDED 270 tablet 3    blood glucose control, low (EASYMAX LOW CONTROL) Soln 1 each by Misc.(Non-Drug; Combo Route) route before breakfast. 3 each 3    blood sugar diagnostic Strp To check BG 2 times daily, to use with insurance preferred meter 200 each 3    brimonidine 0.2% (ALPHAGAN) 0.2 % Drop 1 drop every 8 (eight) hours.      bumetanide (BUMEX) 2 MG tablet Take 1 tablet (2 mg total) by mouth once daily. 30 tablet 11    cholecalciferol, vitamin D3, (VITAMIN D3) 50 mcg (2,000 unit) Tab Take 1 tablet (2,000 Units total) by mouth once daily.      clotrimazole-betamethasone 1-0.05% (LOTRISONE) cream Apply topically 2 (two) times daily. Apply topically to affected area bid 45 g 3    esomeprazole (NEXIUM) 40 MG capsule Take 1 capsule (40 mg total) by mouth before  breakfast. 90 capsule 3    famciclovir (FAMVIR) 500 MG tablet Take 500 mg by mouth 3 (three) times daily.      finasteride (PROSCAR) 5 mg tablet Take 1 tablet (5 mg total) by mouth once daily. 90 tablet 3    gabapentin (NEURONTIN) 100 MG capsule Take 1 capsule (100 mg total) by mouth 2 (two) times daily. 180 capsule 3    ganciclovir (ZIRGAN) 0.15 % Gel Apply to eye.      Lactobacillus rhamnosus GG (CULTURELLE) 10 billion cell capsule Take 1 capsule by mouth once daily.      lancets Misc To check BG 2 times daily, to use with insurance preferred meter 200 each 3    loratadine (CLARITIN) 10 mg tablet Take 10 mg by mouth once daily.      magnesium 30 mg Tab Take 250 mg by mouth once.      meloxicam (MOBIC) 15 MG tablet Take 1 tablet (15 mg total) by mouth daily as needed. 90 tablet 3    metFORMIN (GLUCOPHAGE) 1000 MG tablet Take 1 tablet (1,000 mg total) by mouth 2 (two) times daily. 180 tablet 3    metoprolol succinate (TOPROL-XL) 25 MG 24 hr tablet Take 1 tablet (25 mg total) by mouth once daily. 30 tablet 11    mirabegron (MYRBETRIQ) 25 mg Tb24 ER tablet Take 1 tablet (25 mg total) by mouth once daily. 90 tablet 3    multivitamin capsule Take 1 capsule by mouth once daily.      olopatadine (PATANOL) 0.1 % ophthalmic solution Place 1 drop into the left eye 2 (two) times daily. 5 mL 1    potassium chloride SA (K-DUR,KLOR-CON) 10 MEQ tablet Take 1 tablet (10 mEq total) by mouth 2 (two) times daily. 180 tablet 3    psyllium (METAMUCIL) packet Take 1 packet by mouth once daily.      psyllium 0.52 gram capsule Take 0.52 g by mouth once daily.      silver sulfADIAZINE 1% (SILVADENE) 1 % cream Apply to affected area daily 50 g 1    solifenacin (VESICARE) 5 MG tablet Take 1 tablet (5 mg total) by mouth once daily. 90 tablet 3    tamsulosin (FLOMAX) 0.4 mg Cap Take 1 capsule (0.4 mg total) by mouth once daily. 90 capsule 3    travoprost (TRAVATAN Z) 0.004 % ophthalmic solution Place 1 drop into both eyes  once daily. 5 mL 2     No current facility-administered medications for this visit.       Review of patient's allergies indicates:   Allergen Reactions    Elmiron [pentosan polysulfate sodium]      Bilateral Lower Extremity Pruritis         Review of Systems   Constitutional: Negative for chills and fever.   Cardiovascular: Positive for leg swelling. Negative for claudication.   Skin: Positive for color change and nail changes. Negative for dry skin and rash.   Musculoskeletal: Negative for muscle cramps, muscle weakness and myalgias.   Gastrointestinal: Negative for nausea and vomiting.   Neurological: Positive for numbness. Negative for paresthesias.   Psychiatric/Behavioral: Negative for altered mental status.           Objective:      Physical Exam  Constitutional:       Appearance: Normal appearance. He is not ill-appearing.   Cardiovascular:      Pulses:           Dorsalis pedis pulses are 2+ on the right side and 2+ on the left side.        Posterior tibial pulses are 2+ on the right side and 2+ on the left side.      Comments: CFT is < 3 seconds bilateral.  Pedal hair growth is decreased bilateral.  Varicosities noted bilateral.  Moderate nonpitting lower extremity edema noted bilateral.  Toes are cool to touch bilateral.    Musculoskeletal:         General: Deformity present. No tenderness.      Right lower leg: Edema present.      Left lower leg: Edema present.      Comments: Muscle strength 5/5 in all muscle groups bilateral.  No tenderness nor crepitation with ROM of foot/ankle joints bilateral.  (-) for pain with palpation to the wound at the distal tip of the Rt. 2nd toe.  Bilateral rigid contracture of toes 2-5.     Skin:     General: Skin is warm and dry.      Capillary Refill: Capillary refill takes 2 to 3 seconds.      Findings: Wound present. No bruising, ecchymosis, erythema, signs of injury, laceration, lesion, petechiae or rash.      Comments: Location: Wound noted to the distal tip of the Rt.  2nd toe  Base: Down to dermis and comprised of fibrin.  Drainage: None  Damari wound: Devoid of erythema, edema,and malodor.  Fluctuance and purulence noted.   Pre-debridement measurement: 0.1 x 0.1 x 0.1 cm.  Post-debridement measurement: 0.3 x 0.3 x 0.1cm   Neurological:      Mental Status: He is alert.      Sensory: Sensory deficit present.      Motor: No weakness or atrophy.      Comments: Protective sensation per [place] monofilament is absent bilateral.  Vibratory sensation is absent bilateral.  Light touch is absent bilateral.               Assessment:       Encounter Diagnoses   Name Primary?    Diabetic ulcer of toe of right foot associated with type 2 diabetes mellitus, limited to breakdown of skin Yes    Diabetic polyneuropathy associated with type 2 diabetes mellitus     Hammertoes of both feet          Plan:       Lester was seen today for wound care.    Diagnoses and all orders for this visit:    Diabetic ulcer of toe of right foot associated with type 2 diabetes mellitus, limited to breakdown of skin  -     Wound Debridement    Diabetic polyneuropathy associated with type 2 diabetes mellitus    Hammertoes of both feet      I counseled the patient on his conditions, their implications and medical management.    Performed a selective excisional debridement of the Rt. 2nd toe. See attached procedure note.      The wound base was covered with clarke, offloaded with a foam toe sulcus roll, and a football dressing was applied.    Advised to keep the dressing CDI x 1 week.    Advised to rest and elevate the affected extremity.    Instructed to minimize weight bearing to facilitate wound healing.    Advised to ambulate only in the postoperative shoe/boot.    RTC in 1 week.    Beka Choudhury DPM

## 2022-03-30 NOTE — PROCEDURES
"Wound Debridement    Date/Time: 3/30/2022 2:40 PM  Performed by: Beka Choudhury DPM  Authorized by: Beka Choudhury DPM     Time out: Immediately prior to procedure a "time out" was called to verify the correct patient, procedure, equipment, support staff and site/side marked as required.    Consent Done?:  Yes (Verbal)    Preparation: Patient was prepped and draped in usual sterile fashion    Local anesthesia used?: No      Wound Details:    Location:  Right foot    Location:  Right 2nd Toe    Type of Debridement:  Excisional       Length (cm):  0.1       Area (sq cm):  0.01       Width (cm):  0.1       Percent Debrided (%):  100       Depth (cm):  0.1       Total Area Debrided (sq cm):  0.01    Depth of debridement:  Epidermis/Dermis    Tissue debrided:  Dermis    Devitalized tissue debrided:  Fibrin    Instruments:  Blade    Bleeding:  Minimal  Hemostasis Achieved: Yes    Method Used:  Pressure      "

## 2022-04-06 ENCOUNTER — OFFICE VISIT (OUTPATIENT)
Dept: PODIATRY | Facility: CLINIC | Age: 72
End: 2022-04-06
Payer: MEDICARE

## 2022-04-06 VITALS — HEIGHT: 64 IN | BODY MASS INDEX: 26.12 KG/M2 | WEIGHT: 153 LBS

## 2022-04-06 DIAGNOSIS — E11.42 DIABETIC POLYNEUROPATHY ASSOCIATED WITH TYPE 2 DIABETES MELLITUS: ICD-10-CM

## 2022-04-06 DIAGNOSIS — M20.42 HAMMERTOES OF BOTH FEET: ICD-10-CM

## 2022-04-06 DIAGNOSIS — M20.41 HAMMERTOES OF BOTH FEET: ICD-10-CM

## 2022-04-06 DIAGNOSIS — Z86.31 HEALED DIABETIC FOOT ULCER: Primary | ICD-10-CM

## 2022-04-06 PROCEDURE — 99212 PR OFFICE/OUTPT VISIT, EST, LEVL II, 10-19 MIN: ICD-10-PCS | Mod: S$GLB,,, | Performed by: PODIATRIST

## 2022-04-06 PROCEDURE — 3288F FALL RISK ASSESSMENT DOCD: CPT | Mod: CPTII,S$GLB,, | Performed by: PODIATRIST

## 2022-04-06 PROCEDURE — 4010F ACE/ARB THERAPY RXD/TAKEN: CPT | Mod: CPTII,S$GLB,, | Performed by: PODIATRIST

## 2022-04-06 PROCEDURE — 1157F PR ADVANCE CARE PLAN OR EQUIV PRESENT IN MEDICAL RECORD: ICD-10-PCS | Mod: CPTII,S$GLB,, | Performed by: PODIATRIST

## 2022-04-06 PROCEDURE — 1126F AMNT PAIN NOTED NONE PRSNT: CPT | Mod: CPTII,S$GLB,, | Performed by: PODIATRIST

## 2022-04-06 PROCEDURE — 3008F BODY MASS INDEX DOCD: CPT | Mod: CPTII,S$GLB,, | Performed by: PODIATRIST

## 2022-04-06 PROCEDURE — 3060F PR POS MICROALBUMINURIA RESULT DOCUMENTED/REVIEW: ICD-10-PCS | Mod: CPTII,S$GLB,, | Performed by: PODIATRIST

## 2022-04-06 PROCEDURE — 3060F POS MICROALBUMINURIA REV: CPT | Mod: CPTII,S$GLB,, | Performed by: PODIATRIST

## 2022-04-06 PROCEDURE — 99999 PR PBB SHADOW E&M-EST. PATIENT-LVL II: CPT | Mod: PBBFAC,,, | Performed by: PODIATRIST

## 2022-04-06 PROCEDURE — 1157F ADVNC CARE PLAN IN RCRD: CPT | Mod: CPTII,S$GLB,, | Performed by: PODIATRIST

## 2022-04-06 PROCEDURE — 1126F PR PAIN SEVERITY QUANTIFIED, NO PAIN PRESENT: ICD-10-PCS | Mod: CPTII,S$GLB,, | Performed by: PODIATRIST

## 2022-04-06 PROCEDURE — 1101F PR PT FALLS ASSESS DOC 0-1 FALLS W/OUT INJ PAST YR: ICD-10-PCS | Mod: CPTII,S$GLB,, | Performed by: PODIATRIST

## 2022-04-06 PROCEDURE — 1101F PT FALLS ASSESS-DOCD LE1/YR: CPT | Mod: CPTII,S$GLB,, | Performed by: PODIATRIST

## 2022-04-06 PROCEDURE — 99999 PR PBB SHADOW E&M-EST. PATIENT-LVL II: ICD-10-PCS | Mod: PBBFAC,,, | Performed by: PODIATRIST

## 2022-04-06 PROCEDURE — 3008F PR BODY MASS INDEX (BMI) DOCUMENTED: ICD-10-PCS | Mod: CPTII,S$GLB,, | Performed by: PODIATRIST

## 2022-04-06 PROCEDURE — 3066F PR DOCUMENTATION OF TREATMENT FOR NEPHROPATHY: ICD-10-PCS | Mod: CPTII,S$GLB,, | Performed by: PODIATRIST

## 2022-04-06 PROCEDURE — 4010F PR ACE/ARB THEARPY RXD/TAKEN: ICD-10-PCS | Mod: CPTII,S$GLB,, | Performed by: PODIATRIST

## 2022-04-06 PROCEDURE — 3288F PR FALLS RISK ASSESSMENT DOCUMENTED: ICD-10-PCS | Mod: CPTII,S$GLB,, | Performed by: PODIATRIST

## 2022-04-06 PROCEDURE — 3066F NEPHROPATHY DOC TX: CPT | Mod: CPTII,S$GLB,, | Performed by: PODIATRIST

## 2022-04-06 PROCEDURE — 99212 OFFICE O/P EST SF 10 MIN: CPT | Mod: S$GLB,,, | Performed by: PODIATRIST

## 2022-04-06 NOTE — PROGRESS NOTES
Subjective:      Patient ID: Lester Carmona is a 71 y.o. male.    Chief Complaint: Wound Care, Diabetes Mellitus, and Diabetic Foot Exam (Mari 3/10/22)  Patient presents to clinic 4 weeks S/P I&D of the Rt. 2nd toe.  Denies pain from the site of the wound with today's exam.  Has kept the prior football dressing clean, dry, and intact x 1 week.  Notes minimal ambulation to the extremity while in the [employer] shoe.  Denies experiencing N/V/F/C/D.  Denies any additional pedal complaints.      Hemoglobin A1C   Date Value Ref Range Status   07/14/2021 5.6 <5.7 % of total Hgb Final     Comment:     For the purpose of screening for the presence of  diabetes:     <5.7%       Consistent with the absence of diabetes  5.7-6.4%    Consistent with increased risk for diabetes              (prediabetes)  > or =6.5%  Consistent with diabetes     This assay result is consistent with a decreased risk  of diabetes.     Currently, no consensus exists regarding use of  hemoglobin A1c for diagnosis of diabetes in children.     According to American Diabetes Association (ADA)  guidelines, hemoglobin A1c <7.0% represents optimal  control in non-pregnant diabetic patients. Different  metrics may apply to specific patient populations.   Standards of Medical Care in Diabetes(ADA).         02/12/2021 5.4 <5.7 % of total Hgb Final     Comment:     For the purpose of screening for the presence of  diabetes:     <5.7%       Consistent with the absence of diabetes  5.7-6.4%    Consistent with increased risk for diabetes              (prediabetes)  > or =6.5%  Consistent with diabetes     This assay result is consistent with a decreased risk  of diabetes.     Currently, no consensus exists regarding use of  hemoglobin A1c for diagnosis of diabetes in children.     According to American Diabetes Association (ADA)  guidelines, hemoglobin A1c <7.0% represents optimal  control in non-pregnant diabetic patients. Different  metrics may apply to  specific patient populations.   Standards of Medical Care in Diabetes(ADA).         01/31/2020 5.7 (H) <5.7 % of total Hgb Final     Comment:     For someone without known diabetes, a hemoglobin   A1c value between 5.7% and 6.4% is consistent with  prediabetes and should be confirmed with a   follow-up test.     For someone with known diabetes, a value <7%  indicates that their diabetes is well controlled. A1c  targets should be individualized based on duration of  diabetes, age, comorbid conditions, and other  considerations.     This assay result is consistent with an increased risk  of diabetes.     Currently, no consensus exists regarding use of  hemoglobin A1c for diagnosis of diabetes for children.                Past Medical History:   Diagnosis Date    a Moderate-To-Severe Mitral Regurgitation     Dr. Olivier Romano; Dr. Alonzo flores Severe Chronic Systolic Congestive Heart Failure     Dr. Olivier Romano; 12/09/21 Inc. Lasix, RXd Potassium and Referred to Dr Juan Antonio flores Severe Multivessel Coronary Artery Disease     Dr. Olivier Romano; Dr. Alonzo larsen Chronic Hypotension     Dr. Olivier larsen Hypertension     b Microalbuminuria     On Lisinopril 2.5 Mg Daily    c Hypercholesterolemia With Low HDL     8/7/17 RXd OTC Falxseed Oil 3K Mg Daily, And D/Cd His OTC FO 2K Daily; On Lipitor 40 Mg qHS     d Type 2 Diabetes Mellitus     4/19/15 Referred To [facility] ED[facility]    f Obesity     g Mild Chronic NCNC Anemia     Am Monitoring    i H/O 1 PPD X 18 YRs TUD, Quit In 1995     i Left Sided Rib Area Pain     1/31/18 Ordered A Left Rib XRay Series    i Right Rib Area Pain After A Fall     2/17/20 Ordered A Right Rib Series    j Dysphagia ###    Dr. MANDY Diana; 7/1/16 EGD (With Empiric Dilation) = Normal Except For Gastritis (Was BXd)    j GERD With Gastritis     Dr. MANDY Diana; 7/1/16 EGD (With Empiric Dilation) = Normal Except For Gastritis (Was BXd)    j H/O Colon Polyps On 5/31/19 TC ###     "Dr. MANDY Diana: "Repeat TC In 3 Years"    k Benign Prostatic Hypertrophy     Dr. Sam Rosales    k Chronic Interstitial Cystitis ###    Dr. Sam Rosales; Elmiron Caused Bilateral Leg Pruritis    l Bilateral Knee Osteoarthritis     12/9/16 Referred To Dr. Eder Diaz    l Fall With Right Sided Pain 10/20/15     l H/O Bilateral Knee Arthroscopy Procedures     l Lumbar Spinal DDD     4/19/15 RXd Mobic 15 Mg Daily PRN; He Has Had No SX Or ESIs    m Bilateral Lower Extremity Diabetic Peripheral Neuropathy     2/13/19 RXd Neurontin 100 Mg BID    n Anxiety And Depression     o Allergic Rhinosinusitis     Dr. Sahil Burks    o Chronic Bilateral Tinnitus And Hearing Loss     Dr. Sahil Burks; Christiano Barnard (Audiologist) 6/27/16 Tested Patient And Recommended Hearing Aids    o Deviated Nasal Septum     Dr. Sahil Burks    o Poor Dentition ###    5/24/19 He Will Be Having A Complete Dental Extraction Soon (I Filled Forms Out Today)    o Recurrent Bilateral Cerumen Impactions     Dr. Sahil Burks 6/15/16 Performed Disimpaction    p OU Glaucoma     Dr. Villarreal "Arlen" Vanessa    q Bilateral Lower Extremity Pruritus ###    RTC In 2 Weeks; 5/24/19 Held Elmiron And RXd Lotrisone Cream BID; 6/3/19 BLE AA U/S = Unremarkable (See Report)    q Borderline Vitamin D Deficiency     2/10/19 RXd OTC D3 2K IU Daily    q Cherry Hemangiomas     Dr. Anthony Doarntes; Right Postauricular Hemangioma Was Resected 7/27/16    q H/O Right Buttock Hematoma 10/20/15     q Seborrheic Keratoses     Dr. Anthony Dorantes; Right Postauricular Seborrheic Keratosis Was Resected 7/27/16    Toe abscess, right     Wellness Visit 3/10/2022        Past Surgical History:   Procedure Laterality Date    CATHETERIZATION OF BOTH LEFT AND RIGHT HEART N/A 2/8/2022    Procedure: CATHETERIZATION, HEART, BOTH LEFT AND RIGHT;  Surgeon: Olivier Romano MD;  Location: [facility];  Service: Cardiology;  Laterality: N/A;    COLONOSCOPY   "    CORONARY ANGIOGRAPHY N/A 2022    Procedure: ANGIOGRAM, CORONARY ARTERY;  Surgeon: Oliveir Romano MD;  Location: [facility] CATH;  Service: Cardiology;  Laterality: N/A;    HERNIA REPAIR      KNEE SURGERY      NOSE SURGERY      TESTICLE SURGERY         Family History   Problem Relation Age of Onset    Diabetes Mother     Heart disease Mother     Hypertension Mother     COPD Mother     Arthritis Mother     Cancer Sister        Social History     Socioeconomic History    Marital status:    Tobacco Use    Smoking status: Former Smoker     Quit date:      Years since quittin.2    Smokeless tobacco: Never Used   Substance and Sexual Activity    Alcohol use: Never    Drug use: Never       Current Outpatient Medications   Medication Sig Dispense Refill    acetaminophen (TYLENOL) 325 MG tablet Take 1 tablet (325 mg total) by mouth 2 (two) times daily. (Patient taking differently: Take 325 mg by mouth once daily.) 60 tablet 3    aspirin (ECOTRIN) 81 MG EC tablet Take 1 tablet (81 mg total) by mouth once daily. 90 tablet 3    atorvastatin (LIPITOR) 40 MG tablet Take 1 tablet (40 mg total) by mouth nightly. 90 tablet 3    baclofen (LIORESAL) 10 MG tablet TAKE 1 TABLET 3 TIMES DAILYAS NEEDED 270 tablet 3    blood glucose control, low (EASYMAX LOW CONTROL) Soln 1 each by Misc.(Non-Drug; Combo Route) route before breakfast. 3 each 3    blood sugar diagnostic Strp To check BG 2 times daily, to use with insurance preferred meter 200 each 3    brimonidine 0.2% (ALPHAGAN) 0.2 % Drop 1 drop every 8 (eight) hours.      bumetanide (BUMEX) 2 MG tablet Take 1 tablet (2 mg total) by mouth once daily. 30 tablet 11    cholecalciferol, vitamin D3, (VITAMIN D3) 50 mcg (2,000 unit) Tab Take 1 tablet (2,000 Units total) by mouth once daily.      clotrimazole-betamethasone 1-0.05% (LOTRISONE) cream Apply topically 2 (two) times daily. Apply topically to affected area bid 45 g 3    esomeprazole (NEXIUM)  40 MG capsule Take 1 capsule (40 mg total) by mouth before breakfast. 90 capsule 3    famciclovir (FAMVIR) 500 MG tablet Take 500 mg by mouth 3 (three) times daily.      finasteride (PROSCAR) 5 mg tablet Take 1 tablet (5 mg total) by mouth once daily. 90 tablet 3    gabapentin (NEURONTIN) 100 MG capsule Take 1 capsule (100 mg total) by mouth 2 (two) times daily. 180 capsule 3    ganciclovir (ZIRGAN) 0.15 % Gel Apply to eye.      Lactobacillus rhamnosus GG (CULTURELLE) 10 billion cell capsule Take 1 capsule by mouth once daily.      lancets Misc To check BG 2 times daily, to use with insurance preferred meter 200 each 3    loratadine (CLARITIN) 10 mg tablet Take 10 mg by mouth once daily.      magnesium 30 mg Tab Take 250 mg by mouth once.      meloxicam (MOBIC) 15 MG tablet Take 1 tablet (15 mg total) by mouth daily as needed. 90 tablet 3    metFORMIN (GLUCOPHAGE) 1000 MG tablet Take 1 tablet (1,000 mg total) by mouth 2 (two) times daily. 180 tablet 3    metoprolol succinate (TOPROL-XL) 25 MG 24 hr tablet Take 1 tablet (25 mg total) by mouth once daily. 30 tablet 11    mirabegron (MYRBETRIQ) 25 mg Tb24 ER tablet Take 1 tablet (25 mg total) by mouth once daily. 90 tablet 3    multivitamin capsule Take 1 capsule by mouth once daily.      olopatadine (PATANOL) 0.1 % ophthalmic solution Place 1 drop into the left eye 2 (two) times daily. 5 mL 1    potassium chloride SA (K-DUR,KLOR-CON) 10 MEQ tablet Take 1 tablet (10 mEq total) by mouth 2 (two) times daily. 180 tablet 3    psyllium (METAMUCIL) packet Take 1 packet by mouth once daily.      psyllium 0.52 gram capsule Take 0.52 g by mouth once daily.      silver sulfADIAZINE 1% (SILVADENE) 1 % cream Apply to affected area daily 50 g 1    solifenacin (VESICARE) 5 MG tablet Take 1 tablet (5 mg total) by mouth once daily. 90 tablet 3    tamsulosin (FLOMAX) 0.4 mg Cap Take 1 capsule (0.4 mg total) by mouth once daily. 90 capsule 3    travoprost (TRAVATAN  Z) 0.004 % ophthalmic solution Place 1 drop into both eyes once daily. 5 mL 2     No current facility-administered medications for this visit.       Review of patient's allergies indicates:   Allergen Reactions    Elmiron [pentosan polysulfate sodium]      Bilateral Lower Extremity Pruritis         Review of Systems   Constitutional: Negative for chills and fever.   Cardiovascular: Positive for leg swelling. Negative for claudication.   Skin: Positive for color change and nail changes. Negative for dry skin and rash.   Musculoskeletal: Negative for muscle cramps, muscle weakness and myalgias.   Gastrointestinal: Negative for nausea and vomiting.   Neurological: Positive for numbness. Negative for paresthesias.   Psychiatric/Behavioral: Negative for altered mental status.           Objective:      Physical Exam  Constitutional:       Appearance: Normal appearance. He is not ill-appearing.   Cardiovascular:      Pulses:           Dorsalis pedis pulses are 2+ on the right side and 2+ on the left side.        Posterior tibial pulses are 2+ on the right side and 2+ on the left side.      Comments: CFT is < 3 seconds bilateral.  Pedal hair growth is decreased bilateral.  Varicosities noted bilateral.  Moderate nonpitting lower extremity edema noted bilateral.  Toes are cool to touch bilateral.    Musculoskeletal:         General: Deformity present. No tenderness.      Right lower leg: Edema present.      Left lower leg: Edema present.      Comments: Muscle strength 5/5 in all muscle groups bilateral.  No tenderness nor crepitation with ROM of foot/ankle joints bilateral.  (-) for pain with palpation to the distal tip of the Rt. 2nd toe.  Bilateral rigid contracture of toes 2-5.     Skin:     General: Skin is warm and dry.      Capillary Refill: Capillary refill takes 2 to 3 seconds.      Findings: No bruising, ecchymosis, erythema, signs of injury, laceration, lesion, petechiae, rash or wound.      Comments: Mature  epithelium noted to the distal tip of the Rt. 2nd toe     Neurological:      Mental Status: He is alert.      Sensory: Sensory deficit present.      Motor: No weakness or atrophy.      Comments: Protective sensation per [place] monofilament is absent bilateral.  Vibratory sensation is absent bilateral.  Light touch is absent bilateral.               Assessment:       Encounter Diagnoses   Name Primary?    Healed diabetic foot ulcer Yes    Hammertoes of both feet     Diabetic polyneuropathy associated with type 2 diabetes mellitus          Plan:       Lester was seen today for wound care, diabetes mellitus and diabetic foot exam.    Diagnoses and all orders for this visit:    Healed diabetic foot ulcer    Hammertoes of both feet    Diabetic polyneuropathy associated with type 2 diabetes mellitus      I counseled the patient on his conditions, their implications and medical management.    No wound debridement was performed, as the site appears fully epithelialized.     Patient was fitted with crest pads to minimize pressure to the tip of the Rt. 2nd toe going forward.    Should there be evidence of skin breakdown to the tip of said toe, in the future, we will consider a flexor tenotomy.    May resume his normal walking and showering routine.  Discouraged from soaking/scrubbing the affected extremity x 2 weeks to allow the skin a chance to mature.    Advised to inspect the skin of bilateral foot once daily.    RTC in 3 months for his routine diabetic foot exam.    Beka Choudhury DPM

## 2022-07-12 ENCOUNTER — OFFICE VISIT (OUTPATIENT)
Dept: PODIATRY | Facility: CLINIC | Age: 72
End: 2022-07-12
Payer: MEDICARE

## 2022-07-12 DIAGNOSIS — L84 CALLUS OF FOOT: ICD-10-CM

## 2022-07-12 DIAGNOSIS — M20.42 HAMMERTOES OF BOTH FEET: ICD-10-CM

## 2022-07-12 DIAGNOSIS — M20.41 HAMMERTOES OF BOTH FEET: ICD-10-CM

## 2022-07-12 DIAGNOSIS — E11.42 DIABETIC POLYNEUROPATHY ASSOCIATED WITH TYPE 2 DIABETES MELLITUS: Primary | ICD-10-CM

## 2022-07-12 PROCEDURE — 1159F MED LIST DOCD IN RCRD: CPT | Mod: CPTII,S$GLB,, | Performed by: PODIATRIST

## 2022-07-12 PROCEDURE — 1126F AMNT PAIN NOTED NONE PRSNT: CPT | Mod: CPTII,S$GLB,, | Performed by: PODIATRIST

## 2022-07-12 PROCEDURE — 99499 UNLISTED E&M SERVICE: CPT | Mod: S$GLB,,, | Performed by: PODIATRIST

## 2022-07-12 PROCEDURE — 4010F ACE/ARB THERAPY RXD/TAKEN: CPT | Mod: CPTII,S$GLB,, | Performed by: PODIATRIST

## 2022-07-12 PROCEDURE — 11055 PARING/CUTG B9 HYPRKER LES 1: CPT | Mod: Q9,S$GLB,, | Performed by: PODIATRIST

## 2022-07-12 PROCEDURE — 1159F PR MEDICATION LIST DOCUMENTED IN MEDICAL RECORD: ICD-10-PCS | Mod: CPTII,S$GLB,, | Performed by: PODIATRIST

## 2022-07-12 PROCEDURE — 3060F PR POS MICROALBUMINURIA RESULT DOCUMENTED/REVIEW: ICD-10-PCS | Mod: CPTII,S$GLB,, | Performed by: PODIATRIST

## 2022-07-12 PROCEDURE — 11055 PR TRIM HYPERKERATOTIC SKIN LESION, ONE: ICD-10-PCS | Mod: Q9,S$GLB,, | Performed by: PODIATRIST

## 2022-07-12 PROCEDURE — 3060F POS MICROALBUMINURIA REV: CPT | Mod: CPTII,S$GLB,, | Performed by: PODIATRIST

## 2022-07-12 PROCEDURE — 99499 NO LOS: ICD-10-PCS | Mod: S$GLB,,, | Performed by: PODIATRIST

## 2022-07-12 PROCEDURE — 99999 PR PBB SHADOW E&M-EST. PATIENT-LVL III: CPT | Mod: PBBFAC,,, | Performed by: PODIATRIST

## 2022-07-12 PROCEDURE — 1157F ADVNC CARE PLAN IN RCRD: CPT | Mod: CPTII,S$GLB,, | Performed by: PODIATRIST

## 2022-07-12 PROCEDURE — 1157F PR ADVANCE CARE PLAN OR EQUIV PRESENT IN MEDICAL RECORD: ICD-10-PCS | Mod: CPTII,S$GLB,, | Performed by: PODIATRIST

## 2022-07-12 PROCEDURE — 11721 PR DEBRIDEMENT OF NAILS, 6 OR MORE: ICD-10-PCS | Mod: 59,Q9,S$GLB, | Performed by: PODIATRIST

## 2022-07-12 PROCEDURE — 4010F PR ACE/ARB THEARPY RXD/TAKEN: ICD-10-PCS | Mod: CPTII,S$GLB,, | Performed by: PODIATRIST

## 2022-07-12 PROCEDURE — 3044F HG A1C LEVEL LT 7.0%: CPT | Mod: CPTII,S$GLB,, | Performed by: PODIATRIST

## 2022-07-12 PROCEDURE — 1126F PR PAIN SEVERITY QUANTIFIED, NO PAIN PRESENT: ICD-10-PCS | Mod: CPTII,S$GLB,, | Performed by: PODIATRIST

## 2022-07-12 PROCEDURE — 3066F PR DOCUMENTATION OF TREATMENT FOR NEPHROPATHY: ICD-10-PCS | Mod: CPTII,S$GLB,, | Performed by: PODIATRIST

## 2022-07-12 PROCEDURE — 99999 PR PBB SHADOW E&M-EST. PATIENT-LVL III: ICD-10-PCS | Mod: PBBFAC,,, | Performed by: PODIATRIST

## 2022-07-12 PROCEDURE — 11721 DEBRIDE NAIL 6 OR MORE: CPT | Mod: 59,Q9,S$GLB, | Performed by: PODIATRIST

## 2022-07-12 PROCEDURE — 3044F PR MOST RECENT HEMOGLOBIN A1C LEVEL <7.0%: ICD-10-PCS | Mod: CPTII,S$GLB,, | Performed by: PODIATRIST

## 2022-07-12 PROCEDURE — 3066F NEPHROPATHY DOC TX: CPT | Mod: CPTII,S$GLB,, | Performed by: PODIATRIST

## 2022-07-14 NOTE — PROGRESS NOTES
Subjective:      Patient ID: Lester Carmona is a 71 y.o. male.    Chief Complaint: Diabetic Foot Exam    Lester is a 71 y.o. male who presents to the clinic for evaluation and treatment of diabetic feet. Lester has a past medical history of a Moderate-To-Severe Mitral Regurgitation, a Severe Chronic Systolic Congestive Heart Failure, a Severe Multivessel Coronary Artery Disease, b Chronic Hypotension, b Hypertension, b Microalbuminuria, c Hypercholesterolemia With Low HDL, d Type 2 Diabetes Mellitus, f Obesity, g Mild Chronic NCNC Anemia, i H/O 1 PPD X 18 YRs TUD, Quit In 1995, i Left Sided Rib Area Pain, i Right Rib Area Pain After A Fall, j Dysphagia (###), j GERD With Gastritis, j H/O Colon Polyps On 5/31/19 TC (###), k Benign Prostatic Hypertrophy, k Chronic Interstitial Cystitis (###), l Bilateral Knee Osteoarthritis, l Fall With Right Sided Pain 10/20/15, l H/O Bilateral Knee Arthroscopy Procedures, l Lumbar Spinal DDD, m Bilateral Lower Extremity Diabetic Peripheral Neuropathy, n Anxiety And Depression, o Allergic Rhinosinusitis, o Chronic Bilateral Tinnitus And Hearing Loss, o Deviated Nasal Septum, o Poor Dentition (###), o Recurrent Bilateral Cerumen Impactions, p OU Glaucoma, q Bilateral Lower Extremity Pruritus (###), q Borderline Vitamin D Deficiency, q Cherry Hemangiomas, q H/O Right Buttock Hematoma 10/20/15, q Seborrheic Keratoses, Toe abscess, right, and Wellness Visit 3/10/2022. Patient relates having toenails that are in need of trimming.  Relates continued callus build up to the tip of the Rt. 2nd toe, however, this has been well managed with use of a crest pad.   Denies experiencing pain from these issues with weight bearing.  Has not attempted to self treat.  Notes excellent control over his blood glucose.  Denies any additional pedal complaints.      PCP: Lester Guerra MD    Date Last Seen by PCP: 3/22    Hemoglobin A1C   Date Value Ref Range Status   07/05/2022 5.8 (H) <5.7 % of total  Hgb Final     Comment:     For someone without known diabetes, a hemoglobin   A1c value between 5.7% and 6.4% is consistent with  prediabetes and should be confirmed with a   follow-up test.     For someone with known diabetes, a value <7%  indicates that their diabetes is well controlled. A1c  targets should be individualized based on duration of  diabetes, age, comorbid conditions, and other  considerations.     This assay result is consistent with an increased risk  of diabetes.     Currently, no consensus exists regarding use of  hemoglobin A1c for diagnosis of diabetes for children.        07/14/2021 5.6 <5.7 % of total Hgb Final     Comment:     For the purpose of screening for the presence of  diabetes:     <5.7%       Consistent with the absence of diabetes  5.7-6.4%    Consistent with increased risk for diabetes              (prediabetes)  > or =6.5%  Consistent with diabetes     This assay result is consistent with a decreased risk  of diabetes.     Currently, no consensus exists regarding use of  hemoglobin A1c for diagnosis of diabetes in children.     According to American Diabetes Association (ADA)  guidelines, hemoglobin A1c <7.0% represents optimal  control in non-pregnant diabetic patients. Different  metrics may apply to specific patient populations.   Standards of Medical Care in Diabetes(ADA).         02/12/2021 5.4 <5.7 % of total Hgb Final     Comment:     For the purpose of screening for the presence of  diabetes:     <5.7%       Consistent with the absence of diabetes  5.7-6.4%    Consistent with increased risk for diabetes              (prediabetes)  > or =6.5%  Consistent with diabetes     This assay result is consistent with a decreased risk  of diabetes.     Currently, no consensus exists regarding use of  hemoglobin A1c for diagnosis of diabetes in children.     According to American Diabetes Association (ADA)  guidelines, hemoglobin A1c <7.0% represents optimal  control in non-pregnant  "diabetic patients. Different  metrics may apply to specific patient populations.   Standards of Medical Care in Diabetes(ADA).                 Past Medical History:   Diagnosis Date    a Moderate-To-Severe Mitral Regurgitation     Dr. Olivier Romano; Dr. Alonzo Rubio    a Severe Chronic Systolic Congestive Heart Failure     Dr. Olivier Romano; 12/09/21 Inc. Lasix, RXd Potassium and Referred to Dr Juan Antonio flores Severe Multivessel Coronary Artery Disease     Dr. Olivier Romano; Dr. Alonzo Rubio    b Chronic Hypotension     Dr. Olivier Romano    b Hypertension     b Microalbuminuria     On Lisinopril 2.5 Mg Daily    c Hypercholesterolemia With Low HDL     8/7/17 RXd OTC Falxseed Oil 3K Mg Daily, And D/Cd His OTC FO 2K Daily; On Lipitor 40 Mg qHS     d Type 2 Diabetes Mellitus     4/19/15 Referred To [facility]    f Obesity     g Mild Chronic NCNC Anemia     Am Monitoring    i H/O 1 PPD X 18 YRs TUD, Quit In 1995     i Left Sided Rib Area Pain     1/31/18 Ordered A Left Rib XRay Series    i Right Rib Area Pain After A Fall     2/17/20 Ordered A Right Rib Series    j Dysphagia ###    Dr. MANDY Diana; 7/1/16 EGD (With Empiric Dilation) = Normal Except For Gastritis (Was BXd)    j GERD With Gastritis     Dr. MANDY Diana; 7/1/16 EGD (With Empiric Dilation) = Normal Except For Gastritis (Was BXd)    j H/O Colon Polyps On 5/31/19 TC ###    Dr. MANDY Diana: "Repeat TC In 3 Years"    k Benign Prostatic Hypertrophy     Dr. Sam Rosales    k Chronic Interstitial Cystitis ###    Dr. Sam Rosales; Elmiron Caused Bilateral Leg Pruritis    l Bilateral Knee Osteoarthritis     12/9/16 Referred To Dr. Eder ugalde Fall With Right Sided Pain 10/20/15     l H/O Bilateral Knee Arthroscopy Procedures     l Lumbar Spinal DDD     4/19/15 RXd Mobic 15 Mg Daily PRN; He Has Had No SX Or ESIs    m Bilateral Lower Extremity Diabetic Peripheral Neuropathy     2/13/19 RXd Neurontin 100 Mg BID    n Anxiety And Depression  " "   o Allergic Rhinosinusitis     Dr. Sahil Burks    o Chronic Bilateral Tinnitus And Hearing Loss     Dr. Sahil Burks; Christiano Barnard (Audiologist) 6/27/16 Tested Patient And Recommended Hearing Aids    o Deviated Nasal Septum     Dr. Sahil Burks    o Poor Dentition ###    5/24/19 He Will Be Having A Complete Dental Extraction Soon (I Filled Forms Out Today)    o Recurrent Bilateral Cerumen Impactions     Dr. Sahil Burks 6/15/16 Performed Disimpaction    p OU Glaucoma     Dr. Villarreal "Frere" Vanessa    q Bilateral Lower Extremity Pruritus ###    RTC In 2 Weeks; 5/24/19 Held Elmiron And RXd Lotrisone Cream BID; 6/3/19 BLE AA U/S = Unremarkable (See Report)    q Borderline Vitamin D Deficiency     2/10/19 RXd OTC D3 2K IU Daily    q Cherry Hemangiomas     Dr. Anthony Dorantes; Right Postauricular Hemangioma Was Resected 7/27/16    q H/O Right Buttock Hematoma 10/20/15     q Seborrheic Keratoses     Dr. Anthony Dorantes; Right Postauricular Seborrheic Keratosis Was Resected 7/27/16    Toe abscess, right     Wellness Visit 3/10/2022        Past Surgical History:   Procedure Laterality Date    CATHETERIZATION OF BOTH LEFT AND RIGHT HEART N/A 2/8/2022    Procedure: CATHETERIZATION, HEART, BOTH LEFT AND RIGHT;  Surgeon: Olivier Romano MD;  [facility];  Service: Cardiology;  Laterality: N/A;    COLONOSCOPY      CORONARY ANGIOGRAPHY N/A 2/8/2022    Procedure: ANGIOGRAM, CORONARY ARTERY;  Surgeon: Olivier Romano MD;  Location: [facility] CATH;  Service: Cardiology;  Laterality: N/A;    HERNIA REPAIR      KNEE SURGERY      NOSE SURGERY      TESTICLE SURGERY         Family History   Problem Relation Age of Onset    Diabetes Mother     Heart disease Mother     Hypertension Mother     COPD Mother     Arthritis Mother     Cancer Sister        Social History     Socioeconomic History    Marital status:    Tobacco Use    Smoking status: Former Smoker     Quit date: 1990     " Years since quittin.5    Smokeless tobacco: Never Used   Substance and Sexual Activity    Alcohol use: Never    Drug use: Never       Current Outpatient Medications   Medication Sig Dispense Refill    acetaminophen (TYLENOL) 325 MG tablet Take 1 tablet (325 mg total) by mouth 2 (two) times daily. (Patient taking differently: Take 325 mg by mouth once daily.) 60 tablet 3    aspirin (ECOTRIN) 81 MG EC tablet Take 1 tablet (81 mg total) by mouth once daily. 90 tablet 3    atorvastatin (LIPITOR) 40 MG tablet Take 1 tablet (40 mg total) by mouth nightly. 90 tablet 3    baclofen (LIORESAL) 10 MG tablet TAKE 1 TABLET 3 TIMES DAILYAS NEEDED 270 tablet 3    blood glucose control, low (EASYMAX LOW CONTROL) Soln 1 each by Misc.(Non-Drug; Combo Route) route before breakfast. 3 each 3    blood sugar diagnostic Strp To check BG 2 times daily, to use with insurance preferred meter 200 each 3    brimonidine 0.2% (ALPHAGAN) 0.2 % Drop 1 drop every 8 (eight) hours.      bumetanide (BUMEX) 2 MG tablet Take 1 tablet (2 mg total) by mouth once daily. 30 tablet 11    cholecalciferol, vitamin D3, (VITAMIN D3) 50 mcg (2,000 unit) Tab Take 1 tablet (2,000 Units total) by mouth once daily.      clotrimazole-betamethasone 1-0.05% (LOTRISONE) cream Apply topically 2 (two) times daily. Apply topically to affected area bid 45 g 3    esomeprazole (NEXIUM) 40 MG capsule Take 1 capsule (40 mg total) by mouth before breakfast. 90 capsule 3    famciclovir (FAMVIR) 500 MG tablet Take 500 mg by mouth 2 (two) times daily. Twice a day      finasteride (PROSCAR) 5 mg tablet Take 1 tablet (5 mg total) by mouth once daily. 90 tablet 3    gabapentin (NEURONTIN) 100 MG capsule Take 1 capsule (100 mg total) by mouth 2 (two) times daily. 180 capsule 3    ganciclovir (ZIRGAN) 0.15 % Gel Apply to eye.      Lactobacillus rhamnosus GG (CULTURELLE) 10 billion cell capsule Take 1 capsule by mouth once daily.      lancets Misc To check BG 2  times daily, to use with insurance preferred meter 200 each 3    loratadine (CLARITIN) 10 mg tablet Take 10 mg by mouth once daily.      magnesium 30 mg Tab Take 250 mg by mouth once.      meloxicam (MOBIC) 15 MG tablet Take 1 tablet (15 mg total) by mouth daily as needed. 90 tablet 3    metFORMIN (GLUCOPHAGE) 1000 MG tablet Take 1 tablet (1,000 mg total) by mouth 2 (two) times daily. 180 tablet 3    metoprolol succinate (TOPROL-XL) 25 MG 24 hr tablet Take 1 tablet (25 mg total) by mouth once daily. 30 tablet 11    mirabegron (MYRBETRIQ) 25 mg Tb24 ER tablet Take 1 tablet (25 mg total) by mouth once daily. 90 tablet 3    multivitamin capsule Take 1 capsule by mouth once daily.      mupirocin (BACTROBAN) 2 % ointment Apply topically 3 (three) times daily.      olopatadine (PATANOL) 0.1 % ophthalmic solution Place 1 drop into the left eye 2 (two) times daily. 5 mL 1    potassium chloride SA (K-DUR,KLOR-CON) 10 MEQ tablet Take 1 tablet (10 mEq total) by mouth 2 (two) times daily. 180 tablet 3    psyllium (METAMUCIL) packet Take 1 packet by mouth once daily.      psyllium 0.52 gram capsule Take 0.52 g by mouth once daily.      silver sulfADIAZINE 1% (SILVADENE) 1 % cream Apply to affected area daily 50 g 1    solifenacin (VESICARE) 5 MG tablet Take 1 tablet (5 mg total) by mouth once daily. 90 tablet 3    tamsulosin (FLOMAX) 0.4 mg Cap Take 1 capsule (0.4 mg total) by mouth once daily. 90 capsule 3    travoprost (TRAVATAN Z) 0.004 % ophthalmic solution Place 1 drop into both eyes once daily. 5 mL 2     No current facility-administered medications for this visit.       Review of patient's allergies indicates:   Allergen Reactions    Elmiron [pentosan polysulfate sodium]      Bilateral Lower Extremity Pruritis         Review of Systems   Constitutional: Negative for chills and fever.   Cardiovascular: Positive for leg swelling. Negative for claudication.   Skin: Positive for color change, nail changes and  suspicious lesions. Negative for dry skin and rash.   Musculoskeletal: Negative for muscle cramps, muscle weakness and myalgias.   Gastrointestinal: Negative for nausea and vomiting.   Neurological: Positive for numbness. Negative for paresthesias.   Psychiatric/Behavioral: Negative for altered mental status.           Objective:      Physical Exam  Constitutional:       Appearance: Normal appearance. He is not ill-appearing.   Cardiovascular:      Pulses:           Dorsalis pedis pulses are 2+ on the right side and 2+ on the left side.        Posterior tibial pulses are 2+ on the right side and 2+ on the left side.      Comments: CFT is < 3 seconds bilateral.  Pedal hair growth is decreased bilateral.  Varicosities noted bilateral.  Moderate nonpitting lower extremity edema noted bilateral.  Toes are cool to touch bilateral.    Musculoskeletal:         General: Deformity present. No tenderness.      Right lower leg: Edema present.      Left lower leg: Edema present.      Comments: Muscle strength 5/5 in all muscle groups bilateral.  No tenderness nor crepitation with ROM of foot/ankle joints bilateral.  No tenderness with palpation of bilateral foot and ankle.  Bilateral rigid contracture of toes 2-5.     Skin:     General: Skin is warm and dry.      Capillary Refill: Capillary refill takes 2 to 3 seconds.      Findings: Lesion present. No bruising, ecchymosis, erythema, signs of injury, laceration, petechiae, rash or wound.      Comments: Pedal skin appears thin bilateral.  Toenails x 10 appear thickened by 2 mm, elongated by 4 mm, and discolored with subungual debris.  Light callus build up noted to the tip of the Rt. 2nd toe.  No break noted in adjacent skin integrity.     Neurological:      Sensory: Sensory deficit present.      Motor: No weakness or atrophy.      Comments: Protective sensation per [place] monofilament is absent bilateral.  Vibratory sensation is absent bilateral.  Light touch is  absent bilateral.               Assessment:       Encounter Diagnoses   Name Primary?    Diabetic polyneuropathy associated with type 2 diabetes mellitus Yes    Hammertoes of both feet     Callus of foot          Plan:       Lester was seen today for diabetic foot exam.    Diagnoses and all orders for this visit:    Diabetic polyneuropathy associated with type 2 diabetes mellitus  -     DIABETIC SHOES FOR HOME USE    Hammertoes of both feet  -     DIABETIC SHOES FOR HOME USE    Callus of foot  -     DIABETIC SHOES FOR HOME USE      I counseled the patient on his conditions, their implications and medical management.    Advised to continue wearing diabetic shoes to accommodate digital deformities.  Also, to continue wearing the crest pad on the Rt. Foot.    Shoe inspection. Diabetic Foot Education. Patient reminded of the importance of good nutrition and blood sugar control to help prevent podiatric complications of diabetes. Patient instructed on proper foot hygeine. We discussed wearing proper shoe gear, daily foot inspections, never walking without protective shoe gear, never putting sharp instruments to feet    With patient's permission, nails were aggressively reduced and debrided x 10 to their soft tissue attachment mechanically and with electric , removing all offending nail and debris.  Also, a sterile #15 scalpel was used to trim lesion x 1 down to smooth appearing skin without incident.  Patient relates relief following the procedure.  He will continue to monitor the areas daily, inspect his feet, wear protective shoe gear when ambulatory, moisturizer to maintain skin integrity and follow in this office in approximately 3 months, sooner p.r.n.    Follow up in about 3 months (around 10/12/2022).    Beka Choudhury DPM

## 2022-07-19 ENCOUNTER — TELEPHONE (OUTPATIENT)
Dept: PODIATRY | Facility: CLINIC | Age: 72
End: 2022-07-19
Payer: MEDICARE

## 2022-09-09 ENCOUNTER — TELEPHONE (OUTPATIENT)
Dept: VASCULAR SURGERY | Facility: CLINIC | Age: 72
End: 2022-09-09
Payer: MEDICARE

## 2022-09-09 NOTE — TELEPHONE ENCOUNTER
----- Message from Edgardo Schaeffer sent at 9/9/2022  2:36 PM CDT -----  Type: Needs Medical Advice  Who Called:  Wife/ Margarette Carmona    Best Call Back Number: [phone]  Additional Information: Caller states that she needs a written note describing his diagnosis and care.  Caller states that she needs the letter mailed to the address on file.

## 2022-09-14 PROBLEM — L02.611 TOE ABSCESS, RIGHT: Status: RESOLVED | Noted: 2022-03-10 | Resolved: 2022-09-14

## 2022-09-14 PROBLEM — R93.1 ABNORMAL ECHOCARDIOGRAM: Status: RESOLVED | Noted: 2022-02-08 | Resolved: 2022-09-14

## 2022-09-14 PROBLEM — E11.49 TYPE 2 DIABETES MELLITUS WITH NEUROLOGIC COMPLICATION, WITHOUT LONG-TERM CURRENT USE OF INSULIN: Status: ACTIVE | Noted: 2022-09-14

## 2022-10-11 ENCOUNTER — DOCUMENT SCAN (OUTPATIENT)
Dept: HOME HEALTH SERVICES | Facility: HOSPITAL | Age: 72
End: 2022-10-11
Payer: MEDICARE

## 2022-10-12 ENCOUNTER — OFFICE VISIT (OUTPATIENT)
Dept: PODIATRY | Facility: CLINIC | Age: 72
End: 2022-10-12
Payer: MEDICARE

## 2022-10-12 VITALS — HEIGHT: 64 IN | WEIGHT: 153 LBS | BODY MASS INDEX: 26.12 KG/M2

## 2022-10-12 DIAGNOSIS — E11.42 DIABETIC POLYNEUROPATHY ASSOCIATED WITH TYPE 2 DIABETES MELLITUS: Primary | ICD-10-CM

## 2022-10-12 DIAGNOSIS — M20.41 HAMMERTOES OF BOTH FEET: ICD-10-CM

## 2022-10-12 DIAGNOSIS — B35.1 ONYCHOMYCOSIS DUE TO DERMATOPHYTE: ICD-10-CM

## 2022-10-12 DIAGNOSIS — M20.42 HAMMERTOES OF BOTH FEET: ICD-10-CM

## 2022-10-12 DIAGNOSIS — L84 CALLUS OF FOOT: ICD-10-CM

## 2022-10-12 PROCEDURE — 99999 PR PBB SHADOW E&M-EST. PATIENT-LVL II: ICD-10-PCS | Mod: PBBFAC,,, | Performed by: PODIATRIST

## 2022-10-12 PROCEDURE — 99499 UNLISTED E&M SERVICE: CPT | Mod: S$GLB,,, | Performed by: PODIATRIST

## 2022-10-12 PROCEDURE — 11721 DEBRIDE NAIL 6 OR MORE: CPT | Mod: 59,Q9,S$GLB, | Performed by: PODIATRIST

## 2022-10-12 PROCEDURE — 1157F ADVNC CARE PLAN IN RCRD: CPT | Mod: CPTII,S$GLB,, | Performed by: PODIATRIST

## 2022-10-12 PROCEDURE — 99999 PR PBB SHADOW E&M-EST. PATIENT-LVL II: CPT | Mod: PBBFAC,,, | Performed by: PODIATRIST

## 2022-10-12 PROCEDURE — 1101F PR PT FALLS ASSESS DOC 0-1 FALLS W/OUT INJ PAST YR: ICD-10-PCS | Mod: CPTII,S$GLB,, | Performed by: PODIATRIST

## 2022-10-12 PROCEDURE — 3288F PR FALLS RISK ASSESSMENT DOCUMENTED: ICD-10-PCS | Mod: CPTII,S$GLB,, | Performed by: PODIATRIST

## 2022-10-12 PROCEDURE — 3008F PR BODY MASS INDEX (BMI) DOCUMENTED: ICD-10-PCS | Mod: CPTII,S$GLB,, | Performed by: PODIATRIST

## 2022-10-12 PROCEDURE — 3066F PR DOCUMENTATION OF TREATMENT FOR NEPHROPATHY: ICD-10-PCS | Mod: CPTII,S$GLB,, | Performed by: PODIATRIST

## 2022-10-12 PROCEDURE — 1157F PR ADVANCE CARE PLAN OR EQUIV PRESENT IN MEDICAL RECORD: ICD-10-PCS | Mod: CPTII,S$GLB,, | Performed by: PODIATRIST

## 2022-10-12 PROCEDURE — 3060F PR POS MICROALBUMINURIA RESULT DOCUMENTED/REVIEW: ICD-10-PCS | Mod: CPTII,S$GLB,, | Performed by: PODIATRIST

## 2022-10-12 PROCEDURE — 1159F PR MEDICATION LIST DOCUMENTED IN MEDICAL RECORD: ICD-10-PCS | Mod: CPTII,S$GLB,, | Performed by: PODIATRIST

## 2022-10-12 PROCEDURE — 1101F PT FALLS ASSESS-DOCD LE1/YR: CPT | Mod: CPTII,S$GLB,, | Performed by: PODIATRIST

## 2022-10-12 PROCEDURE — 11721 PR DEBRIDEMENT OF NAILS, 6 OR MORE: ICD-10-PCS | Mod: 59,Q9,S$GLB, | Performed by: PODIATRIST

## 2022-10-12 PROCEDURE — 3008F BODY MASS INDEX DOCD: CPT | Mod: CPTII,S$GLB,, | Performed by: PODIATRIST

## 2022-10-12 PROCEDURE — 4010F PR ACE/ARB THEARPY RXD/TAKEN: ICD-10-PCS | Mod: CPTII,S$GLB,, | Performed by: PODIATRIST

## 2022-10-12 PROCEDURE — 4010F ACE/ARB THERAPY RXD/TAKEN: CPT | Mod: CPTII,S$GLB,, | Performed by: PODIATRIST

## 2022-10-12 PROCEDURE — 99499 NO LOS: ICD-10-PCS | Mod: S$GLB,,, | Performed by: PODIATRIST

## 2022-10-12 PROCEDURE — 1126F PR PAIN SEVERITY QUANTIFIED, NO PAIN PRESENT: ICD-10-PCS | Mod: CPTII,S$GLB,, | Performed by: PODIATRIST

## 2022-10-12 PROCEDURE — 11056 PARNG/CUTG B9 HYPRKR LES 2-4: CPT | Mod: Q9,S$GLB,, | Performed by: PODIATRIST

## 2022-10-12 PROCEDURE — 3044F HG A1C LEVEL LT 7.0%: CPT | Mod: CPTII,S$GLB,, | Performed by: PODIATRIST

## 2022-10-12 PROCEDURE — 3288F FALL RISK ASSESSMENT DOCD: CPT | Mod: CPTII,S$GLB,, | Performed by: PODIATRIST

## 2022-10-12 PROCEDURE — 3060F POS MICROALBUMINURIA REV: CPT | Mod: CPTII,S$GLB,, | Performed by: PODIATRIST

## 2022-10-12 PROCEDURE — 1126F AMNT PAIN NOTED NONE PRSNT: CPT | Mod: CPTII,S$GLB,, | Performed by: PODIATRIST

## 2022-10-12 PROCEDURE — 3044F PR MOST RECENT HEMOGLOBIN A1C LEVEL <7.0%: ICD-10-PCS | Mod: CPTII,S$GLB,, | Performed by: PODIATRIST

## 2022-10-12 PROCEDURE — 3066F NEPHROPATHY DOC TX: CPT | Mod: CPTII,S$GLB,, | Performed by: PODIATRIST

## 2022-10-12 PROCEDURE — 11056 PR TRIM BENIGN HYPERKERATOTIC SKIN LESION,2-4: ICD-10-PCS | Mod: Q9,S$GLB,, | Performed by: PODIATRIST

## 2022-10-12 PROCEDURE — 1159F MED LIST DOCD IN RCRD: CPT | Mod: CPTII,S$GLB,, | Performed by: PODIATRIST

## 2022-10-12 NOTE — PROGRESS NOTES
Subjective:      Patient ID: Lester Carmona is a 72 y.o. male.    Chief Complaint: Diabetes Mellitus and Diabetic Foot Exam (Mari)    Lester is a 72 y.o. male who presents to the clinic for evaluation and treatment of diabetic feet. Lester has a past medical history of a Moderate-To-Severe Mitral Regurgitation, a Severe Chronic Systolic Congestive Heart Failure, a Severe Multivessel Coronary Artery Disease, b Chronic Hypotension, b Microalbuminuria, c Hypercholesterolemia With Low HDL, d Type 2 Diabetes Mellitus, f Obesity, g Mild Chronic NCNC Anemia, i H/O 1 PPD X 18 YRs TUD, Quit In 1995, i Left Sided Rib Area Pain, i Right Rib Area Pain After A Fall, j Dysphagia (###), j GERD With Gastritis, j H/O Colon Polyps On 5/31/19 TC (###), k Benign Prostatic Hypertrophy, k Chronic Interstitial Cystitis (###), l Bilateral Knee Osteoarthritis, l Fall With Right Sided Pain 10/20/15, l H/O Bilateral Knee Arthroscopy Procedures, l Lumbar Spinal DDD, m Bilateral Lower Extremity Diabetic Peripheral Neuropathy, n Anxiety And Depression, o Allergic Rhinosinusitis, o Chronic Bilateral Tinnitus And Hearing Loss, o Deviated Nasal Septum, o Poor Dentition (###), o Recurrent Bilateral Cerumen Impactions, p OU Glaucoma, q Bilateral Lower Extremity Pruritus (###), q Borderline Vitamin D Deficiency, q Cherry Hemangiomas, q H/O Right Buttock Hematoma 10/20/15, q Seborrheic Keratoses, Toe abscess, right, and Wellness Visit 3/10/2022. Patient relates having toenails that are in need of trimming.  Denies experiencing pain from this issue.  Has not attempted to self treat.  Continues donning a crest pad to offload the Rt. 2nd toe.  Continues with good control over his blood glucose.  Denies any additional pedal complaints.      PCP: Lester Guerra MD    Date Last Seen by PCP: 9/22    Hemoglobin A1C   Date Value Ref Range Status   07/05/2022 5.8 (H) <5.7 % of total Hgb Final     Comment:     For someone without known diabetes, a  hemoglobin   A1c value between 5.7% and 6.4% is consistent with  prediabetes and should be confirmed with a   follow-up test.     For someone with known diabetes, a value <7%  indicates that their diabetes is well controlled. A1c  targets should be individualized based on duration of  diabetes, age, comorbid conditions, and other  considerations.     This assay result is consistent with an increased risk  of diabetes.     Currently, no consensus exists regarding use of  hemoglobin A1c for diagnosis of diabetes for children.        07/14/2021 5.6 <5.7 % of total Hgb Final     Comment:     For the purpose of screening for the presence of  diabetes:     <5.7%       Consistent with the absence of diabetes  5.7-6.4%    Consistent with increased risk for diabetes              (prediabetes)  > or =6.5%  Consistent with diabetes     This assay result is consistent with a decreased risk  of diabetes.     Currently, no consensus exists regarding use of  hemoglobin A1c for diagnosis of diabetes in children.     According to American Diabetes Association (ADA)  guidelines, hemoglobin A1c <7.0% represents optimal  control in non-pregnant diabetic patients. Different  metrics may apply to specific patient populations.   Standards of Medical Care in Diabetes(ADA).         02/12/2021 5.4 <5.7 % of total Hgb Final     Comment:     For the purpose of screening for the presence of  diabetes:     <5.7%       Consistent with the absence of diabetes  5.7-6.4%    Consistent with increased risk for diabetes              (prediabetes)  > or =6.5%  Consistent with diabetes     This assay result is consistent with a decreased risk  of diabetes.     Currently, no consensus exists regarding use of  hemoglobin A1c for diagnosis of diabetes in children.     According to American Diabetes Association (ADA)  guidelines, hemoglobin A1c <7.0% represents optimal  control in non-pregnant diabetic patients. Different  metrics may apply to specific  "patient populations.   Standards of Medical Care in Diabetes(ADA).                 Past Medical History:   Diagnosis Date    a Moderate-To-Severe Mitral Regurgitation     Dr. Olivier Romano; Dr. Alonzo Rubio    a Severe Chronic Systolic Congestive Heart Failure     Dr. Olivier Romano; 12/09/21 Inc. Lasix, RXd Potassium and Referred to Dr Romano    a Severe Multivessel Coronary Artery Disease     Dr. Olivier Romano; Dr. Alonzo Rubio    b Chronic Hypotension     Dr. Olivier Romano; No Lisinopril 2.5 Mg Daily Because Of Chronic Hypotension    b Microalbuminuria     No Lisinopril 2.5 Mg Daily Because Of Chronic Hypotension    c Hypercholesterolemia With Low HDL     8/7/17 RXd OTC Falxseed Oil 3K Mg Daily, And D/Cd His OTC FO 2K Daily; On Lipitor 40 Mg qHS     d Type 2 Diabetes Mellitus     4/19/15 Referred To DM [facility]    f Obesity     g Mild Chronic NCNC Anemia     Am Monitoring    i H/O 1 PPD X 18 YRs TUD, Quit In 1995     i Left Sided Rib Area Pain     1/31/18 Ordered A Left Rib XRay Series    i Right Rib Area Pain After A Fall     2/17/20 Ordered A Right Rib Series    j Dysphagia ###    Dr. MANDY Diana; 7/1/16 EGD (With Empiric Dilation) = Normal Except For Gastritis (Was BXd)    j GERD With Gastritis     Dr. MANDY Diana; 7/1/16 EGD (With Empiric Dilation) = Normal Except For Gastritis (Was BXd)    j H/O Colon Polyps On 5/31/19 TC ###    Dr. MANDY Diana: "Repeat TC In 3 Years"    k Benign Prostatic Hypertrophy     Dr. Sam Rosales    k Chronic Interstitial Cystitis ###    Dr. Sam Rosales; Elmiron Caused Bilateral Leg Pruritis    l Bilateral Knee Osteoarthritis     12/9/16 Referred To Dr. Eder ugalde Fall With Right Sided Pain 10/20/15     l H/O Bilateral Knee Arthroscopy Procedures     l Lumbar Spinal DDD     4/19/15 RXd Mobic 15 Mg Daily PRN; He Has Had No SX Or ESIs    m Bilateral Lower Extremity Diabetic Peripheral Neuropathy     2/13/19 RXd Neurontin 100 Mg BID    n Anxiety And Depression     o Allergic " "Rhinosinusitis     Dr. Sahil Burks    o Chronic Bilateral Tinnitus And Hearing Loss     Dr. Sahil Burks; Christiano Barnard (Audiologist) 16 Tested Patient And Recommended Hearing Aids    o Deviated Nasal Septum     Dr. Sahil Burks    o Poor Dentition ###    19 He Will Be Having A Complete Dental Extraction Soon (I Filled Forms Out Today)    o Recurrent Bilateral Cerumen Impactions     Dr. Sahil Burks 6/15/16 Performed Disimpaction    p OU Glaucoma     Dr. Villarreal "Arlen" Vanessa    q Bilateral Lower Extremity Pruritus ###    RTC In 2 Weeks; 19 Held Elmiron And RXd Lotrisone Cream BID; 6/3/19 BLE AA U/S = Unremarkable (See Report)    q Borderline Vitamin D Deficiency     2/10/19 RXd OTC D3 2K IU Daily    q Cherry Hemangiomas     Dr. Anthony Dorantes; Right Postauricular Hemangioma Was Resected 16    q H/O Right Buttock Hematoma 10/20/15     q Seborrheic Keratoses     Dr. Anthony Dorantes; Right Postauricular Seborrheic Keratosis Was Resected 16    Toe abscess, right     Wellness Visit 3/10/2022        Past Surgical History:   Procedure Laterality Date    CATHETERIZATION OF BOTH LEFT AND RIGHT HEART N/A 2022    Procedure: CATHETERIZATION, HEART, BOTH LEFT AND RIGHT;  Surgeon: Olivier Romano MD;  [facility];  Service: Cardiology;  Laterality: N/A;    COLONOSCOPY      CORONARY ANGIOGRAPHY N/A 2022    Procedure: ANGIOGRAM, CORONARY ARTERY;  Surgeon: Olivier Romano MD;  Location: [facility];  Service: Cardiology;  Laterality: N/A;    HERNIA REPAIR      KNEE SURGERY      NOSE SURGERY      TESTICLE SURGERY         Family History   Problem Relation Age of Onset    Diabetes Mother     Heart disease Mother     Hypertension Mother     COPD Mother     Arthritis Mother     Cancer Sister        Social History     Socioeconomic History    Marital status:    Tobacco Use    Smoking status: Former     Types: Cigarettes     Quit date:      Years since quittin.8    " Smokeless tobacco: Never   Substance and Sexual Activity    Alcohol use: Never    Drug use: Never       Current Outpatient Medications   Medication Sig Dispense Refill    acetaminophen (TYLENOL) 325 MG tablet Take 1 tablet (325 mg total) by mouth 2 (two) times daily. (Patient taking differently: Take 325 mg by mouth once daily.) 60 tablet 3    aspirin (ECOTRIN) 81 MG EC tablet Take 1 tablet (81 mg total) by mouth once daily. 90 tablet 3    atorvastatin (LIPITOR) 40 MG tablet Take 1 tablet (40 mg total) by mouth nightly. 90 tablet 3    baclofen (LIORESAL) 10 MG tablet TAKE 1 TABLET 3 TIMES DAILYAS NEEDED 270 tablet 3    blood glucose control, low (EASYMAX LOW CONTROL) Soln 1 each by Misc.(Non-Drug; Combo Route) route before breakfast. 3 each 3    blood sugar diagnostic Strp To check BG 2 times daily, to use with insurance preferred meter 200 each 3    brimonidine 0.2% (ALPHAGAN) 0.2 % Drop 1 drop every 8 (eight) hours.      bumetanide (BUMEX) 2 MG tablet Take 1 tablet (2 mg total) by mouth once daily. 30 tablet 11    cholecalciferol, vitamin D3, (VITAMIN D3) 50 mcg (2,000 unit) Tab Take 1 tablet (2,000 Units total) by mouth once daily.      clotrimazole-betamethasone 1-0.05% (LOTRISONE) cream Apply topically 2 (two) times daily. Apply topically to affected area bid 45 g 3    esomeprazole (NEXIUM) 40 MG capsule Take 1 capsule (40 mg total) by mouth before breakfast. 90 capsule 3    famciclovir (FAMVIR) 500 MG tablet Take 500 mg by mouth 2 (two) times daily. Twice a day      finasteride (PROSCAR) 5 mg tablet Take 1 tablet (5 mg total) by mouth once daily. 90 tablet 3    gabapentin (NEURONTIN) 100 MG capsule Take 1 capsule (100 mg total) by mouth 2 (two) times daily. 180 capsule 3    ganciclovir (ZIRGAN) 0.15 % Gel Apply to eye.      Lactobacillus rhamnosus GG (CULTURELLE) 10 billion cell capsule Take 1 capsule by mouth once daily.      lancets Misc To check BG 2 times daily, to use with insurance preferred meter 200  each 3    loratadine (CLARITIN) 10 mg tablet Take 10 mg by mouth once daily.      magnesium 30 mg Tab Take 250 mg by mouth once.      meloxicam (MOBIC) 15 MG tablet Take 1 tablet (15 mg total) by mouth daily as needed. 90 tablet 3    metFORMIN (GLUCOPHAGE) 1000 MG tablet Take 1 tablet (1,000 mg total) by mouth 2 (two) times daily. 180 tablet 3    metoprolol succinate (TOPROL-XL) 25 MG 24 hr tablet Take 1 tablet (25 mg total) by mouth once daily. 30 tablet 11    mirabegron (MYRBETRIQ) 25 mg Tb24 ER tablet Take 1 tablet (25 mg total) by mouth once daily. 90 tablet 3    multivitamin capsule Take 1 capsule by mouth once daily.      mupirocin (BACTROBAN) 2 % ointment Apply topically 3 (three) times daily.      olopatadine (PATANOL) 0.1 % ophthalmic solution Place 1 drop into the left eye 2 (two) times daily. 5 mL 1    potassium chloride SA (K-DUR,KLOR-CON) 10 MEQ tablet Take 1 tablet (10 mEq total) by mouth 2 (two) times daily. 180 tablet 3    psyllium (METAMUCIL) packet Take 1 packet by mouth once daily.      psyllium 0.52 gram capsule Take 0.52 g by mouth once daily.      silver sulfADIAZINE 1% (SILVADENE) 1 % cream Apply to affected area daily 50 g 1    solifenacin (VESICARE) 5 MG tablet Take 1 tablet (5 mg total) by mouth once daily. 90 tablet 3    tamsulosin (FLOMAX) 0.4 mg Cap Take 1 capsule (0.4 mg total) by mouth once daily. 90 capsule 3    travoprost (TRAVATAN Z) 0.004 % ophthalmic solution Place 1 drop into both eyes once daily. 5 mL 2    valACYclovir (VALTREX) 1000 MG tablet Take 1,000 mg by mouth 2 (two) times daily.       No current facility-administered medications for this visit.       Review of patient's allergies indicates:   Allergen Reactions    Elmiron [pentosan polysulfate sodium]      Bilateral Lower Extremity Pruritis         Review of Systems   Constitutional: Negative for chills and fever.   Cardiovascular:  Positive for leg swelling. Negative for claudication.   Skin:  Positive for color change,  nail changes and suspicious lesions. Negative for dry skin and rash.   Musculoskeletal:  Negative for muscle cramps, muscle weakness and myalgias.   Gastrointestinal:  Negative for nausea and vomiting.   Neurological:  Positive for numbness. Negative for paresthesias.   Psychiatric/Behavioral:  Negative for altered mental status.          Objective:      Physical Exam  Constitutional:       Appearance: Normal appearance. He is not ill-appearing.   Cardiovascular:      Pulses:           Dorsalis pedis pulses are 2+ on the right side and 2+ on the left side.        Posterior tibial pulses are 2+ on the right side and 2+ on the left side.      Comments: CFT is < 3 seconds bilateral.  Pedal hair growth is decreased bilateral.  Varicosities noted bilateral.  Moderate nonpitting lower extremity edema noted bilateral.  Toes are cool to touch bilateral.    Musculoskeletal:         General: Deformity present. No tenderness.      Right lower leg: Edema present.      Left lower leg: Edema present.      Comments: Muscle strength 5/5 in all muscle groups bilateral.  No tenderness nor crepitation with ROM of foot/ankle joints bilateral.  No tenderness with palpation of bilateral foot and ankle.  Bilateral rigid contracture of toes 2-5.     Skin:     General: Skin is warm and dry.      Capillary Refill: Capillary refill takes 2 to 3 seconds.      Findings: Lesion present. No bruising, ecchymosis, erythema, signs of injury, laceration, petechiae, rash or wound.      Comments: Pedal skin appears thin bilateral.  Toenails x 10 appear thickened by 2 mm, elongated by 4 mm, and discolored with subungual debris.  Light callus build up noted to the tip of the bilateral 2nd toe.  No break noted in adjacent skin integrity.     Neurological:      Sensory: Sensory deficit present.      Motor: No weakness or atrophy.      Comments: Protective sensation per [place] monofilament is absent bilateral.  Vibratory sensation is absent  bilateral.  Light touch is absent bilateral.             Assessment:       Encounter Diagnoses   Name Primary?    Diabetic polyneuropathy associated with type 2 diabetes mellitus Yes    Hammertoes of both feet     Callus of foot     Onychomycosis due to dermatophyte          Plan:       Lester was seen today for diabetes mellitus and diabetic foot exam.    Diagnoses and all orders for this visit:    Diabetic polyneuropathy associated with type 2 diabetes mellitus    Hammertoes of both feet    Callus of foot    Onychomycosis due to dermatophyte    I counseled the patient on his conditions, their implications and medical management.    Advised to continue wearing diabetic shoes to accommodate digital deformities.     To continue wearing the crest pad on the Rt. Foot to offload the 2nd digit and the Lt. 2nd toe as well.    Shoe inspection. Diabetic Foot Education. Patient reminded of the importance of good nutrition and blood sugar control to help prevent podiatric complications of diabetes. Patient instructed on proper foot hygeine. We discussed wearing proper shoe gear, daily foot inspections, never walking without protective shoe gear, never putting sharp instruments to feet    With patient's permission, nails were aggressively reduced and debrided x 10 to their soft tissue attachment mechanically and with electric , removing all offending nail and debris.  Also, a sterile #15 scalpel was used to trim lesions x 2 down to smooth appearing skin without incident.  Patient relates relief following the procedure.  He will continue to monitor the areas daily, inspect his feet, wear protective shoe gear when ambulatory, moisturizer to maintain skin integrity and follow in this office in approximately 3 months, sooner p.r.n.    Follow up in about 3 months (around 1/12/2023).    Beka Choudhury DPM

## 2023-03-02 ENCOUNTER — OFFICE VISIT (OUTPATIENT)
Dept: PODIATRY | Facility: CLINIC | Age: 73
End: 2023-03-02
Payer: MEDICARE

## 2023-03-02 DIAGNOSIS — M20.42 HAMMERTOES OF BOTH FEET: ICD-10-CM

## 2023-03-02 DIAGNOSIS — E11.42 DIABETIC POLYNEUROPATHY ASSOCIATED WITH TYPE 2 DIABETES MELLITUS: Primary | ICD-10-CM

## 2023-03-02 DIAGNOSIS — M20.41 HAMMERTOES OF BOTH FEET: ICD-10-CM

## 2023-03-02 DIAGNOSIS — B35.1 ONYCHOMYCOSIS DUE TO DERMATOPHYTE: ICD-10-CM

## 2023-03-02 PROCEDURE — 1157F ADVNC CARE PLAN IN RCRD: CPT | Mod: CPTII,S$GLB,, | Performed by: PODIATRIST

## 2023-03-02 PROCEDURE — 99213 PR OFFICE/OUTPT VISIT, EST, LEVL III, 20-29 MIN: ICD-10-PCS | Mod: 25,S$GLB,, | Performed by: PODIATRIST

## 2023-03-02 PROCEDURE — 1157F PR ADVANCE CARE PLAN OR EQUIV PRESENT IN MEDICAL RECORD: ICD-10-PCS | Mod: CPTII,S$GLB,, | Performed by: PODIATRIST

## 2023-03-02 PROCEDURE — 11721 DEBRIDE NAIL 6 OR MORE: CPT | Mod: Q9,S$GLB,, | Performed by: PODIATRIST

## 2023-03-02 PROCEDURE — 11721 PR DEBRIDEMENT OF NAILS, 6 OR MORE: ICD-10-PCS | Mod: Q9,S$GLB,, | Performed by: PODIATRIST

## 2023-03-02 PROCEDURE — 99213 OFFICE O/P EST LOW 20 MIN: CPT | Mod: 25,S$GLB,, | Performed by: PODIATRIST

## 2023-03-03 NOTE — PROGRESS NOTES
Subjective:      Patient ID: Lester Carmona is a 72 y.o. male.    Chief Complaint: No chief complaint on file.      Lester is a 72 y.o. male who presents to the clinic for evaluation and treatment of diabetic feet. Lester has a past medical history of a Moderate-To-Severe Mitral Regurgitation, a Severe Chronic Systolic Congestive Heart Failure, a Severe Multivessel Coronary Artery Disease, b Chronic Hypotension, b Microalbuminuria, c Hypercholesterolemia With Low HDL, d Type 2 Diabetes Mellitus, f Obesity, g Mild Chronic NCNC Anemia, i H/O 1 PPD X 18 YRs TUD, Quit In 1995, i Left Sided Rib Area Pain, i Right Rib Area Pain After A Fall, j Dysphagia (###), j GERD With Gastritis, j H/O Colon Polyps On 5/31/19 TC (###), k Benign Prostatic Hypertrophy, k Chronic Interstitial Cystitis (###), l Bilateral Knee Osteoarthritis, l Fall With Right Sided Pain 10/20/15, l H/O Bilateral Knee Arthroscopy Procedures, l Lumbar Spinal DDD, m Bilateral Lower Extremity Diabetic Peripheral Neuropathy, n Anxiety And Depression, o Allergic Rhinosinusitis, o Chronic Bilateral Tinnitus And Hearing Loss, o Deviated Nasal Septum, o Poor Dentition (###), o Recurrent Bilateral Cerumen Impactions, p OU Glaucoma, q Bilateral Lower Extremity Pruritus (###), q Borderline Vitamin D Deficiency, q Cherry Hemangiomas, q H/O Right Buttock Hematoma 10/20/15, q Seborrheic Keratoses, Toe abscess, right, and Wellness Visit 3/10/2022. Patient relates having toenails that are in need of trimming.  Denies experiencing pain from this issue.  Has not attempted to self treat.  Notes excellent control over his blood glucose.  Denies any additional pedal complaints.      PCP: Lester Guerra MD    Date Last Seen by PCP: 10/22    Hemoglobin A1C   Date Value Ref Range Status   07/05/2022 5.8 (H) <5.7 % of total Hgb Final     Comment:     For someone without known diabetes, a hemoglobin   A1c value between 5.7% and 6.4% is consistent with  prediabetes and should  be confirmed with a   follow-up test.     For someone with known diabetes, a value <7%  indicates that their diabetes is well controlled. A1c  targets should be individualized based on duration of  diabetes, age, comorbid conditions, and other  considerations.     This assay result is consistent with an increased risk  of diabetes.     Currently, no consensus exists regarding use of  hemoglobin A1c for diagnosis of diabetes for children.        07/14/2021 5.6 <5.7 % of total Hgb Final     Comment:     For the purpose of screening for the presence of  diabetes:     <5.7%       Consistent with the absence of diabetes  5.7-6.4%    Consistent with increased risk for diabetes              (prediabetes)  > or =6.5%  Consistent with diabetes     This assay result is consistent with a decreased risk  of diabetes.     Currently, no consensus exists regarding use of  hemoglobin A1c for diagnosis of diabetes in children.     According to American Diabetes Association (ADA)  guidelines, hemoglobin A1c <7.0% represents optimal  control in non-pregnant diabetic patients. Different  metrics may apply to specific patient populations.   Standards of Medical Care in Diabetes(ADA).         02/12/2021 5.4 <5.7 % of total Hgb Final     Comment:     For the purpose of screening for the presence of  diabetes:     <5.7%       Consistent with the absence of diabetes  5.7-6.4%    Consistent with increased risk for diabetes              (prediabetes)  > or =6.5%  Consistent with diabetes     This assay result is consistent with a decreased risk  of diabetes.     Currently, no consensus exists regarding use of  hemoglobin A1c for diagnosis of diabetes in children.     According to American Diabetes Association (ADA)  guidelines, hemoglobin A1c <7.0% represents optimal  control in non-pregnant diabetic patients. Different  metrics may apply to specific patient populations.   Standards of Medical Care in Diabetes(ADA).                 Past  "Medical History:   Diagnosis Date    a Moderate-To-Severe Mitral Regurgitation     Dr. Olivier Romano; Dr. Alonzo Rubio    a Severe Chronic Systolic Congestive Heart Failure     Dr. Olivier Romano; 12/09/21 Inc. Lasix, RXd Potassium and Referred to Dr Romano    a Severe Multivessel Coronary Artery Disease     Dr. Olivier Romano; Dr. Alonzo Rubio    b Chronic Hypotension     Dr. Olivier Romano; No Lisinopril 2.5 Mg Daily Because Of Chronic Hypotension    b Microalbuminuria     No Lisinopril 2.5 Mg Daily Because Of Chronic Hypotension    c Hypercholesterolemia With Low HDL     8/7/17 RXd OTC Falxseed Oil 3K Mg Daily, And D/Cd His OTC FO 2K Daily; On Lipitor 40 Mg qHS     d Type 2 Diabetes Mellitus     4/19/15 Referred To [facility]    f Obesity     g Mild Chronic NCNC Anemia     Am Monitoring    i H/O 1 PPD X 18 YRs TUD, Quit In 1995     i Left Sided Rib Area Pain     1/31/18 Ordered A Left Rib XRay Series    i Right Rib Area Pain After A Fall     2/17/20 Ordered A Right Rib Series    j Dysphagia ###    Dr. MANDY Diana; 7/1/16 EGD (With Empiric Dilation) = Normal Except For Gastritis (Was BXd)    j GERD With Gastritis     Dr. MANDY Diana; 7/1/16 EGD (With Empiric Dilation) = Normal Except For Gastritis (Was BXd)    j H/O Colon Polyps On 5/31/19 TC ###    Dr. MANDY Diana: "Repeat TC In 3 Years"    k Benign Prostatic Hypertrophy     Dr. Sam Rosales    k Chronic Interstitial Cystitis ###    Dr. Sam Rosales; Elmiron Caused Bilateral Leg Pruritis    l Bilateral Knee Osteoarthritis     12/9/16 Referred To Dr. Eder Diaz    l Fall With Right Sided Pain 10/20/15     l H/O Bilateral Knee Arthroscopy Procedures     l Lumbar Spinal DDD     4/19/15 RXd Mobic 15 Mg Daily PRN; He Has Had No SX Or ESIs    m Bilateral Lower Extremity Diabetic Peripheral Neuropathy     2/13/19 RXd Neurontin 100 Mg BID    n Anxiety And Depression     o Allergic Rhinosinusitis     Dr. Sahil Burks    o Chronic Bilateral Tinnitus And Hearing Loss  " "   Dr. Sahil Burks; Christiano Barnard (Audiologist) 16 Tested Patient And Recommended Hearing Aids    o Deviated Nasal Septum     Dr. Sahil Burks    o Poor Dentition ###    19 He Will Be Having A Complete Dental Extraction Soon (I Filled Forms Out Today)    o Recurrent Bilateral Cerumen Impactions     Dr. Sahil Burks 6/15/16 Performed Disimpaction    p OU Glaucoma     Dr. Villarreal "Arlen" Vanessa    q Bilateral Lower Extremity Pruritus ###    RTC In 2 Weeks; 19 Held Elmiron And RXd Lotrisone Cream BID; 6/3/19 BLE AA U/S = Unremarkable (See Report)    q Borderline Vitamin D Deficiency     2/10/19 RXd OTC D3 2K IU Daily    q Cherry Hemangiomas     Dr. Anthony Dorantes; Right Postauricular Hemangioma Was Resected 16    q H/O Right Buttock Hematoma 10/20/15     q Seborrheic Keratoses     Dr. Anthony Dorantes; Right Postauricular Seborrheic Keratosis Was Resected 16    Toe abscess, right     Wellness Visit 3/10/2022        Past Surgical History:   Procedure Laterality Date    CATHETERIZATION OF BOTH LEFT AND RIGHT HEART N/A 2022    Procedure: CATHETERIZATION, HEART, BOTH LEFT AND RIGHT;  Surgeon: Olivier Romano MD;  Location: [facility] CATH;  Service: Cardiology;  Laterality: N/A;    COLONOSCOPY      CORONARY ANGIOGRAPHY N/A 2022    Procedure: ANGIOGRAM, CORONARY ARTERY;  Surgeon: Olivier Romano MD;  [facility]ATH;  Service: Cardiology;  Laterality: N/A;    HERNIA REPAIR      KNEE SURGERY      NOSE SURGERY      TESTICLE SURGERY         Family History   Problem Relation Age of Onset    Diabetes Mother     Heart disease Mother     Hypertension Mother     COPD Mother     Arthritis Mother     Cancer Sister        Social History     Socioeconomic History    Marital status:    Tobacco Use    Smoking status: Former     Types: Cigarettes     Quit date:      Years since quittin.1    Smokeless tobacco: Never   Substance and Sexual Activity    Alcohol use: Never    Drug " use: Never       Current Outpatient Medications   Medication Sig Dispense Refill    acetaminophen (TYLENOL) 325 MG tablet Take 1 tablet (325 mg total) by mouth 2 (two) times daily. (Patient taking differently: Take 325 mg by mouth once daily.) 60 tablet 3    aspirin (ECOTRIN) 81 MG EC tablet Take 1 tablet (81 mg total) by mouth once daily. 90 tablet 3    atorvastatin (LIPITOR) 40 MG tablet Take 1 tablet (40 mg total) by mouth nightly. 90 tablet 3    baclofen (LIORESAL) 10 MG tablet TAKE 1 TABLET 3 TIMES DAILYAS NEEDED 270 tablet 3    blood glucose control, low (EASYMAX LOW CONTROL) Soln 1 each by Misc.(Non-Drug; Combo Route) route before breakfast. 3 each 3    blood sugar diagnostic Strp To check BG 2 times daily, to use with insurance preferred meter 200 each 3    brimonidine 0.2% (ALPHAGAN) 0.2 % Drop 1 drop every 8 (eight) hours.      bumetanide (BUMEX) 2 MG tablet TAKE 1 TABLET BY MOUTH ONCE DAILY. 90 tablet 3    cholecalciferol, vitamin D3, (VITAMIN D3) 50 mcg (2,000 unit) Tab Take 1 tablet (2,000 Units total) by mouth once daily.      clotrimazole-betamethasone 1-0.05% (LOTRISONE) cream Apply topically 2 (two) times daily. Apply topically to affected area bid 45 g 3    esomeprazole (NEXIUM) 40 MG capsule Take 1 capsule (40 mg total) by mouth before breakfast. 90 capsule 3    famciclovir (FAMVIR) 500 MG tablet Take 500 mg by mouth 2 (two) times daily. Twice a day      finasteride (PROSCAR) 5 mg tablet Take 1 tablet (5 mg total) by mouth once daily. 90 tablet 3    gabapentin (NEURONTIN) 100 MG capsule Take 1 capsule (100 mg total) by mouth 2 (two) times daily. 180 capsule 3    ganciclovir (ZIRGAN) 0.15 % Gel Apply to eye.      Lactobacillus rhamnosus GG (CULTURELLE) 10 billion cell capsule Take 1 capsule by mouth once daily.      lancets Misc To check BG 2 times daily, to use with insurance preferred meter 200 each 3    loratadine (CLARITIN) 10 mg tablet Take 10 mg by mouth once daily.      magnesium 30 mg Tab  Take 250 mg by mouth once.      meloxicam (MOBIC) 15 MG tablet Take 1 tablet (15 mg total) by mouth daily as needed. 90 tablet 3    metFORMIN (GLUCOPHAGE) 1000 MG tablet Take 1 tablet (1,000 mg total) by mouth 2 (two) times daily. 180 tablet 3    metoprolol succinate (TOPROL-XL) 25 MG 24 hr tablet Take 1 tablet (25 mg total) by mouth once daily. 30 tablet 11    mirabegron (MYRBETRIQ) 25 mg Tb24 ER tablet Take 1 tablet (25 mg total) by mouth once daily. 90 tablet 3    multivitamin capsule Take 1 capsule by mouth once daily.      mupirocin (BACTROBAN) 2 % ointment Apply topically 3 (three) times daily.      olopatadine (PATANOL) 0.1 % ophthalmic solution Place 1 drop into the left eye 2 (two) times daily. 5 mL 1    potassium chloride SA (K-DUR,KLOR-CON) 10 MEQ tablet Take 1 tablet (10 mEq total) by mouth 2 (two) times daily. 180 tablet 3    psyllium (METAMUCIL) packet Take 1 packet by mouth once daily.      psyllium 0.52 gram capsule Take 0.52 g by mouth once daily.      solifenacin (VESICARE) 5 MG tablet Take 1 tablet (5 mg total) by mouth once daily. 90 tablet 3    tamsulosin (FLOMAX) 0.4 mg Cap Take 1 capsule (0.4 mg total) by mouth once daily. 90 capsule 3    travoprost (TRAVATAN Z) 0.004 % ophthalmic solution Place 1 drop into both eyes once daily. 5 mL 2    valACYclovir (VALTREX) 1000 MG tablet Take 1,000 mg by mouth 2 (two) times daily.       No current facility-administered medications for this visit.       Review of patient's allergies indicates:   Allergen Reactions    Elmiron [pentosan polysulfate sodium]      Bilateral Lower Extremity Pruritis         Review of Systems   Constitutional: Negative for chills and fever.   Cardiovascular:  Positive for leg swelling. Negative for claudication.   Skin:  Positive for color change and nail changes. Negative for dry skin, rash and suspicious lesions.   Musculoskeletal:  Negative for muscle cramps, muscle weakness and myalgias.   Gastrointestinal:  Negative for  nausea and vomiting.   Neurological:  Positive for numbness. Negative for paresthesias.   Psychiatric/Behavioral:  Negative for altered mental status.          Objective:      Physical Exam  Constitutional:       Appearance: Normal appearance. He is not ill-appearing.   Cardiovascular:      Pulses:           Dorsalis pedis pulses are 2+ on the right side and 2+ on the left side.        Posterior tibial pulses are 2+ on the right side and 2+ on the left side.      Comments: CFT is < 3 seconds bilateral.  Pedal hair growth is decreased bilateral.  Varicosities noted bilateral.  Moderate nonpitting lower extremity edema noted bilateral.  Toes are cool to touch bilateral.    Musculoskeletal:         General: Deformity present. No tenderness.      Right lower leg: Edema present.      Left lower leg: Edema present.      Comments: Muscle strength 5/5 in all muscle groups bilateral.  No tenderness nor crepitation with ROM of foot/ankle joints bilateral.  No tenderness with palpation of bilateral foot and ankle.  Bilateral rigid contracture of toes 2-5.     Skin:     General: Skin is warm and dry.      Capillary Refill: Capillary refill takes 2 to 3 seconds.      Findings: No bruising, ecchymosis, erythema, signs of injury, laceration, lesion, petechiae, rash or wound.      Comments: Pedal skin appears thin bilateral.  Toenails x 10 appear thickened by 2 mm, elongated by 4 mm, and discolored with subungual debris.     Neurological:      Sensory: Sensory deficit present.      Motor: No weakness or atrophy.      Comments: Protective sensation per [place] monofilament is absent bilateral.  Vibratory sensation is absent bilateral.  Light touch is absent bilateral.             Assessment:       Encounter Diagnoses   Name Primary?    Diabetic polyneuropathy associated with type 2 diabetes mellitus Yes    Hammertoes of both feet     Onychomycosis due to dermatophyte          Plan:       Diagnoses and all orders for this  visit:    Diabetic polyneuropathy associated with type 2 diabetes mellitus    Hammertoes of both feet    Onychomycosis due to dermatophyte    I counseled the patient on his conditions, their implications and medical management.    Advised to continue wearing diabetic shoes to accommodate digital deformities.     To continue wearing crest pads to offload bilateral contracture of the lesser digits.      Shoe inspection. Diabetic Foot Education. Patient reminded of the importance of good nutrition and blood sugar control to help prevent podiatric complications of diabetes. Patient instructed on proper foot hygeine. We discussed wearing proper shoe gear, daily foot inspections, never walking without protective shoe gear, never putting sharp instruments to feet    With patient's permission, nails were aggressively reduced and debrided x 10 to their soft tissue attachment mechanically and with electric , removing all offending nail and debris.  Patient relates relief following the procedure.  He will continue to monitor the areas daily, inspect his feet, wear protective shoe gear when ambulatory, moisturizer to maintain skin integrity and follow in this office in approximately 3 months, sooner p.r.n.    Follow up in about 3 months (around 6/2/2023).    Beka Choudhury DPM

## 2023-04-12 PROBLEM — E11.65 TYPE 2 DIABETES MELLITUS WITH HYPERGLYCEMIA, WITHOUT LONG-TERM CURRENT USE OF INSULIN: Status: ACTIVE | Noted: 2023-04-12

## 2023-04-12 PROBLEM — Z86.0100 HISTORY OF COLON POLYPS: Status: ACTIVE | Noted: 2023-04-12

## 2023-04-12 PROBLEM — E66.3 OVERWEIGHT (BMI 25.0-29.9): Status: ACTIVE | Noted: 2023-04-12

## 2023-04-12 PROBLEM — Z86.010 HISTORY OF COLON POLYPS: Status: ACTIVE | Noted: 2023-04-12

## 2023-07-05 ENCOUNTER — TELEPHONE (OUTPATIENT)
Dept: PODIATRY | Facility: CLINIC | Age: 73
End: 2023-07-05
Payer: MEDICARE

## 2023-07-05 NOTE — TELEPHONE ENCOUNTER
----- Message from Debra Vazquez sent at 7/5/2023  2:13 PM CDT -----  Contact: patient wife  Type:  Needs Medical Advice    Who Called: Patient wifeheladio     Would the patient rather a call back or a response via [facility]? Call     Best Call Back Number: [phone]    Additional Information: Patient's wifeHeladio would like to speak with the nurse in regards to appointment.     Please call to advise

## 2023-10-12 DIAGNOSIS — L84 CALLUS OF FOOT: ICD-10-CM

## 2023-10-12 DIAGNOSIS — E11.42 DIABETIC POLYNEUROPATHY ASSOCIATED WITH TYPE 2 DIABETES MELLITUS: Primary | ICD-10-CM

## 2023-10-12 DIAGNOSIS — M20.42 HAMMERTOES OF BOTH FEET: ICD-10-CM

## 2023-10-12 DIAGNOSIS — M20.41 HAMMERTOES OF BOTH FEET: ICD-10-CM

## 2023-10-12 PROBLEM — L97.929 VENOUS ULCER OF LEFT LEG: Status: ACTIVE | Noted: 2023-10-12

## 2023-10-12 PROBLEM — E11.621 DIABETIC ULCER OF TOE OF RIGHT FOOT ASSOCIATED WITH TYPE 2 DIABETES MELLITUS, LIMITED TO BREAKDOWN OF SKIN: Status: ACTIVE | Noted: 2023-10-12

## 2023-10-12 PROBLEM — I25.119 CORONARY ARTERY DISEASE INVOLVING NATIVE CORONARY ARTERY OF NATIVE HEART WITH ANGINA PECTORIS: Status: ACTIVE | Noted: 2023-10-12

## 2023-10-12 PROBLEM — I83.029 VENOUS ULCER OF LEFT LEG: Status: ACTIVE | Noted: 2023-10-12

## 2023-10-12 PROBLEM — L97.511 DIABETIC ULCER OF TOE OF RIGHT FOOT ASSOCIATED WITH TYPE 2 DIABETES MELLITUS, LIMITED TO BREAKDOWN OF SKIN: Status: ACTIVE | Noted: 2023-10-12

## 2023-11-06 ENCOUNTER — OFFICE VISIT (OUTPATIENT)
Dept: PODIATRY | Facility: CLINIC | Age: 73
End: 2023-11-06
Payer: MEDICARE

## 2023-11-06 DIAGNOSIS — E11.42 DIABETIC POLYNEUROPATHY ASSOCIATED WITH TYPE 2 DIABETES MELLITUS: Primary | ICD-10-CM

## 2023-11-06 DIAGNOSIS — B35.1 ONYCHOMYCOSIS DUE TO DERMATOPHYTE: ICD-10-CM

## 2023-11-06 DIAGNOSIS — M20.41 HAMMERTOES OF BOTH FEET: ICD-10-CM

## 2023-11-06 DIAGNOSIS — M20.42 HAMMERTOES OF BOTH FEET: ICD-10-CM

## 2023-11-06 PROCEDURE — 11721 PR DEBRIDEMENT OF NAILS, 6 OR MORE: ICD-10-PCS | Mod: Q9,S$GLB,, | Performed by: PODIATRIST

## 2023-11-06 PROCEDURE — 11721 DEBRIDE NAIL 6 OR MORE: CPT | Mod: Q9,S$GLB,, | Performed by: PODIATRIST

## 2023-11-06 PROCEDURE — 99499 UNLISTED E&M SERVICE: CPT | Mod: S$GLB,,, | Performed by: PODIATRIST

## 2023-11-06 PROCEDURE — 99499 NO LOS: ICD-10-PCS | Mod: S$GLB,,, | Performed by: PODIATRIST

## 2023-11-06 NOTE — PROGRESS NOTES
Subjective:      Patient ID: Lester Carmona Sr. is a 73 y.o. male.    Chief Complaint: No chief complaint on file.      Lester is a 73 y.o. male who presents to the clinic for evaluation and treatment of diabetic feet. Lester has a past medical history of a Moderate-To-Severe Mitral Regurgitation, a Severe Chronic Systolic Congestive Heart Failure, a Severe Multivessel Coronary Artery Disease, b Chronic Hypotension, b Microalbuminuria, c Hypercholesterolemia With Low HDL, d Type 2 Diabetes Mellitus, f Obesity, g Mild Chronic NCNC Anemia, i H/O 1 PPD X 18 YRs TUD, Quit In 1995, i Left Sided Rib Area Pain, i Right Rib Area Pain After A Fall, j Dysphagia With H/O Empiric Dilatation, j GERD With Gastritis, j H/O Colon Polyps On Previous Colonoscopy (###), k Benign Prostatic Hypertrophy, k Chronic Interstitial Cystitis (###), l Bilateral Knee Osteoarthritis, l Fall With Right Sided Pain 10/20/15, l H/O Bilateral Knee Arthroscopy Procedures, l Lumbar Spinal DDD, m Bilateral Lower Extremity Diabetic Peripheral Neuropathy, n Anxiety And Depression, o Allergic Rhinosinusitis, o Chronic Bilateral Tinnitus And Hearing Loss, o Deviated Nasal Septum, o Poor Dentition (###), o Recurrent Bilateral Cerumen Impactions, p OU Glaucoma, q Bilateral Lower Extremity Pruritus (###), q Borderline Vitamin D Deficiency, q Cherry Hemangiomas, q H/O Right Buttock Hematoma 10/20/15, q Seborrheic Keratoses, Toe abscess, right, and Wellness Visit 4/12/2023. Patient relates having toenails that are in need of trimming.  Denies experiencing pain from this issue.  Has not attempted to self treat.  Continues with good control over his blood glucose.  Denies experiencing overt neuropathy pain with today's exam.  Denies any additional pedal complaints.      PCP: Lester Guerra MD    Date Last Seen by PCP: 10/23    Hemoglobin A1C   Date Value Ref Range Status   10/04/2023 5.7 (H) <5.7 % of total Hgb Final     Comment:     For someone without  known diabetes, a hemoglobin   A1c value between 5.7% and 6.4% is consistent with  prediabetes and should be confirmed with a   follow-up test.     For someone with known diabetes, a value <7%  indicates that their diabetes is well controlled. A1c  targets should be individualized based on duration of  diabetes, age, comorbid conditions, and other  considerations.     This assay result is consistent with an increased risk  of diabetes.     Currently, no consensus exists regarding use of  hemoglobin A1c for diagnosis of diabetes for children.        07/05/2022 5.8 (H) <5.7 % of total Hgb Final     Comment:     For someone without known diabetes, a hemoglobin   A1c value between 5.7% and 6.4% is consistent with  prediabetes and should be confirmed with a   follow-up test.     For someone with known diabetes, a value <7%  indicates that their diabetes is well controlled. A1c  targets should be individualized based on duration of  diabetes, age, comorbid conditions, and other  considerations.     This assay result is consistent with an increased risk  of diabetes.     Currently, no consensus exists regarding use of  hemoglobin A1c for diagnosis of diabetes for children.        07/14/2021 5.6 <5.7 % of total Hgb Final     Comment:     For the purpose of screening for the presence of  diabetes:     <5.7%       Consistent with the absence of diabetes  5.7-6.4%    Consistent with increased risk for diabetes              (prediabetes)  > or =6.5%  Consistent with diabetes     This assay result is consistent with a decreased risk  of diabetes.     Currently, no consensus exists regarding use of  hemoglobin A1c for diagnosis of diabetes in children.     According to American Diabetes Association (ADA)  guidelines, hemoglobin A1c <7.0% represents optimal  control in non-pregnant diabetic patients. Different  metrics may apply to specific patient populations.   Standards of Medical Care in Diabetes(ADA).                 Past  Medical History:   Diagnosis Date    a Moderate-To-Severe Mitral Regurgitation     Dr. Olivier Romano; Dr. Alonzo Rubio    a Severe Chronic Systolic Congestive Heart Failure     Dr. Olivier Romano; 12/09/21 Inc. Lasix, RXd Potassium and Referred to Dr Romano    a Severe Multivessel Coronary Artery Disease     Dr. Olivier Romano; Dr. Alonzo Rubio    b Chronic Hypotension     Dr. Olivier Romano; No Lisinopril 2.5 Mg Daily Because Of Chronic Hypotension    b Microalbuminuria     No Lisinopril 2.5 Mg Daily Because Of Chronic Hypotension    c Hypercholesterolemia With Low HDL     8/7/17 RXd OTC Falxseed Oil 3K Mg Daily, And D/Cd His OTC FO 2K Daily; On Lipitor 40 Mg qHS     d Type 2 Diabetes Mellitus     4/19/15 Referred To [facility]    f Obesity     g Mild Chronic NCNC Anemia     Am Monitoring    i H/O 1 PPD X 18 YRs TUD, Quit In 1995     i Left Sided Rib Area Pain     1/31/18 Ordered A Left Rib XRay Series    i Right Rib Area Pain After A Fall     2/17/20 Ordered A Right Rib Series    j Dysphagia With H/O Empiric Dilatation     Dr. MANDY Diana; 7/1/16 EGD (With Empiric Dilation) = Normal Except For Gastritis (Was BXd)    j GERD With Gastritis     Dr. MANDY Diana; 7/1/16 EGD (With Empiric Dilation) = Normal Except For Gastritis (Was BXd)    j H/O Colon Polyps On Previous Colonoscopy ###    Dr. MANDY Diana; His 10/5/22 Cologuard = Was Negative    k Benign Prostatic Hypertrophy     Dr. Sam Rosales    k Chronic Interstitial Cystitis ###    Dr. Sam Rosales; Elmiron Caused Bilateral Leg Pruritis    l Bilateral Knee Osteoarthritis     12/9/16 Referred To Dr. Eder ugalde Fall With Right Sided Pain 10/20/15     l H/O Bilateral Knee Arthroscopy Procedures     l Lumbar Spinal DDD     4/19/15 RXd Mobic 15 Mg Daily PRN; He Has Had No SX Or ESIs    m Bilateral Lower Extremity Diabetic Peripheral Neuropathy     2/13/19 RXd Neurontin 100 Mg BID    n Anxiety And Depression     o Allergic Rhinosinusitis     Dr. Sahil Burks     "o Chronic Bilateral Tinnitus And Hearing Loss     Dr. Sahil Burks; Christiano Barnard (Audiologist) 16 Tested Patient And Recommended Hearing Aids    o Deviated Nasal Septum     Dr. Sahil Burks    o Poor Dentition ###    19 He Will Be Having A Complete Dental Extraction Soon (I Filled Forms Out Today)    o Recurrent Bilateral Cerumen Impactions     Dr. Sahil Burks 6/15/16 Performed Disimpaction    p OU Glaucoma     Dr. Villarreal "Frere" Vanessa    q Bilateral Lower Extremity Pruritus ###    RTC In 2 Weeks; 19 Held Elmiron And RXd Lotrisone Cream BID; 6/3/19 BLE AA U/S = Unremarkable (See Report)    q Borderline Vitamin D Deficiency     2/10/19 RXd OTC D3 2K IU Daily    q Cherry Hemangiomas     Dr. Anthony Dorantes; Right Postauricular Hemangioma Was Resected 16    q H/O Right Buttock Hematoma 10/20/15     q Seborrheic Keratoses     Dr. Anthony Dorantes; Right Postauricular Seborrheic Keratosis Was Resected 16    Toe abscess, right     Wellness Visit 2023        Past Surgical History:   Procedure Laterality Date    CATHETERIZATION OF BOTH LEFT AND RIGHT HEART N/A 2022    Procedure: CATHETERIZATION, HEART, BOTH LEFT AND RIGHT;  Surgeon: Olivier Romano MD;  Location: [facility] CATH;  Service: Cardiology;  Laterality: N/A;    COLONOSCOPY      CORONARY ANGIOGRAPHY N/A 2022    Procedure: ANGIOGRAM, CORONARY ARTERY;  Surgeon: Olivier Romano MD;  Location: [facility] CATH;  Service: Cardiology;  Laterality: N/A;    HERNIA REPAIR      KNEE SURGERY      NOSE SURGERY      TESTICLE SURGERY         Family History   Problem Relation Age of Onset    Diabetes Mother     Heart disease Mother     Hypertension Mother     COPD Mother     Arthritis Mother     Cancer Sister        Social History     Socioeconomic History    Marital status:    Tobacco Use    Smoking status: Former     Current packs/day: 0.00     Types: Cigarettes     Quit date:      Years since quittin.8     Passive " exposure: Past    Smokeless tobacco: Never   Substance and Sexual Activity    Alcohol use: Never    Drug use: Never       Current Outpatient Medications   Medication Sig Dispense Refill    acetaminophen (TYLENOL) 325 MG tablet Take 1 tablet (325 mg total) by mouth 2 (two) times daily. (Patient taking differently: Take 325 mg by mouth once daily.) 60 tablet 3    aspirin (ECOTRIN) 81 MG EC tablet Take 1 tablet (81 mg total) by mouth once daily. 90 tablet 3    atorvastatin (LIPITOR) 40 MG tablet Take 1 tablet (40 mg total) by mouth nightly. 90 tablet 3    baclofen (LIORESAL) 10 MG tablet TAKE 1 TABLET 3 TIMES DAILYAS NEEDED 270 tablet 3    blood glucose control, low (EASYMAX LOW CONTROL) Soln 1 each by Misc.(Non-Drug; Combo Route) route before breakfast. 3 each 3    blood sugar diagnostic Strp To check BG 2 times daily, to use with insurance preferred meter 200 each 3    brimonidine 0.2% (ALPHAGAN) 0.2 % Drop 1 drop every 8 (eight) hours.      bumetanide (BUMEX) 2 MG tablet Take 1 tablet (2 mg total) by mouth once daily. 90 tablet 3    cholecalciferol, vitamin D3, (VITAMIN D3) 50 mcg (2,000 unit) Tab Take 1 tablet (2,000 Units total) by mouth once daily.      clotrimazole-betamethasone 1-0.05% (LOTRISONE) cream Apply topically 2 (two) times daily. Apply topically to affected area bid 45 g 3    esomeprazole (NEXIUM) 40 MG capsule Take 1 capsule (40 mg total) by mouth before breakfast. 90 capsule 3    famciclovir (FAMVIR) 500 MG tablet Take 500 mg by mouth 2 (two) times daily. Twice a day      finasteride (PROSCAR) 5 mg tablet Take 1 tablet (5 mg total) by mouth once daily. 90 tablet 3    gabapentin (NEURONTIN) 100 MG capsule Take 1 capsule (100 mg total) by mouth 2 (two) times daily. 180 capsule 1    ganciclovir (ZIRGAN) 0.15 % Gel Apply to eye.      ganciclovir (ZIRGAN) 0.15 % Gel Apply to eye.      Lactobacillus rhamnosus GG (CULTURELLE) 10 billion cell capsule Take 1 capsule by mouth once daily.      lancets Misc To  check BG 2 times daily, to use with insurance preferred meter 200 each 3    loratadine (CLARITIN) 10 mg tablet Take 10 mg by mouth once daily.      magnesium 30 mg Tab Take 250 mg by mouth once.      meloxicam (MOBIC) 15 MG tablet Take 1 tablet (15 mg total) by mouth daily as needed for Pain (take with food). 90 tablet 1    metFORMIN (GLUCOPHAGE) 1000 MG tablet Take 1 tablet (1,000 mg total) by mouth 2 (two) times daily. 180 tablet 3    metoprolol succinate (TOPROL-XL) 25 MG 24 hr tablet Take 1 tablet (25 mg total) by mouth once daily. 90 tablet 3    mirabegron (MYRBETRIQ) 25 mg Tb24 ER tablet Take 1 tablet (25 mg total) by mouth once daily. 90 tablet 3    multivitamin capsule Take 1 capsule by mouth once daily.      mupirocin (BACTROBAN) 2 % ointment Apply topically 3 (three) times daily.      olopatadine (PATANOL) 0.1 % ophthalmic solution Place 1 drop into the left eye 2 (two) times daily. 5 mL 1    potassium chloride SA (K-DUR,KLOR-CON M) 10 MEQ tablet Take 1 tablet (10 mEq total) by mouth 2 (two) times daily. 180 tablet 3    solifenacin (VESICARE) 5 MG tablet Take 1 tablet (5 mg total) by mouth once daily. 90 tablet 1    tamsulosin (FLOMAX) 0.4 mg Cap Take 1 capsule (0.4 mg total) by mouth once daily. 90 capsule 3    travoprost (TRAVATAN Z) 0.004 % ophthalmic solution Place 1 drop into both eyes once daily. 5 mL 2    valACYclovir (VALTREX) 1000 MG tablet Take 1,000 mg by mouth 2 (two) times daily.       No current facility-administered medications for this visit.       Review of patient's allergies indicates:   Allergen Reactions    Elmiron [pentosan polysulfate sodium]      Bilateral Lower Extremity Pruritis         Review of Systems   Constitutional: Negative for chills and fever.   Cardiovascular:  Positive for leg swelling. Negative for claudication.   Skin:  Positive for color change and nail changes. Negative for dry skin, rash and suspicious lesions.   Musculoskeletal:  Negative for muscle cramps, muscle  weakness and myalgias.   Gastrointestinal:  Negative for nausea and vomiting.   Neurological:  Positive for numbness. Negative for paresthesias.   Psychiatric/Behavioral:  Negative for altered mental status.            Objective:      Physical Exam  Constitutional:       Appearance: Normal appearance. He is not ill-appearing.   Cardiovascular:      Pulses:           Dorsalis pedis pulses are 2+ on the right side and 2+ on the left side.        Posterior tibial pulses are 2+ on the right side and 2+ on the left side.      Comments: CFT is < 3 seconds bilateral.  Pedal hair growth is decreased bilateral.  Varicosities noted bilateral.  Moderate nonpitting lower extremity edema noted bilateral.  Toes are cool to touch bilateral.    Musculoskeletal:         General: Deformity present. No tenderness.      Right lower leg: Edema present.      Left lower leg: Edema present.      Comments: Muscle strength 5/5 in all muscle groups bilateral.  No tenderness nor crepitation with ROM of foot/ankle joints bilateral.  No tenderness with palpation of bilateral foot and ankle.  Bilateral rigid contracture of toes 2-5.     Skin:     General: Skin is warm and dry.      Capillary Refill: Capillary refill takes 2 to 3 seconds.      Findings: No bruising, ecchymosis, erythema, signs of injury, laceration, lesion, petechiae, rash or wound.      Comments: Pedal skin appears thin bilateral.  Toenails x 10 appear thickened by 2 mm, elongated by 6 mm, and discolored with subungual debris.     Neurological:      Sensory: Sensory deficit present.      Motor: No weakness or atrophy.      Comments: Protective sensation per [place] monofilament is absent bilateral.  Vibratory sensation is absent bilateral.  Light touch is absent bilateral.               Assessment:       Encounter Diagnoses   Name Primary?    Diabetic polyneuropathy associated with type 2 diabetes mellitus Yes    Hammertoes of both feet     Onychomycosis due to  dermatophyte          Plan:       Diagnoses and all orders for this visit:    Diabetic polyneuropathy associated with type 2 diabetes mellitus    Hammertoes of both feet    Onychomycosis due to dermatophyte      I counseled the patient on his conditions, their implications and medical management.    Advised to continue wearing diabetic shoes to accommodate digital deformities.  New Rx written at the conclusion of today's exam.    To continue wearing crest pads to offload bilateral contracture of the lesser digits.  New pads distributed.      Shoe inspection. Diabetic Foot Education. Patient reminded of the importance of good nutrition and blood sugar control to help prevent podiatric complications of diabetes. Patient instructed on proper foot hygeine. We discussed wearing proper shoe gear, daily foot inspections, never walking without protective shoe gear, never putting sharp instruments to feet    With patient's permission, nails were aggressively reduced and debrided x 10 to their soft tissue attachment mechanically and with electric , removing all offending nail and debris.  Patient relates relief following the procedure.  He will continue to monitor the areas daily, inspect his feet, wear protective shoe gear when ambulatory, moisturizer to maintain skin integrity and follow in this office in approximately 4 months, sooner p.r.n.    Follow up in about 4 months (around 3/6/2024).    Beka Choudhury DPM

## 2024-03-04 ENCOUNTER — TELEPHONE (OUTPATIENT)
Dept: PODIATRY | Facility: CLINIC | Age: 74
End: 2024-03-04
Payer: MEDICARE

## 2024-04-25 ENCOUNTER — OFFICE VISIT (OUTPATIENT)
Dept: PODIATRY | Facility: CLINIC | Age: 74
End: 2024-04-25
Payer: MEDICARE

## 2024-04-25 VITALS — WEIGHT: 179.88 LBS | HEIGHT: 64 IN | BODY MASS INDEX: 30.71 KG/M2

## 2024-04-25 DIAGNOSIS — R23.8 SKIN IRRITATION: ICD-10-CM

## 2024-04-25 DIAGNOSIS — M20.42 HAMMERTOES OF BOTH FEET: ICD-10-CM

## 2024-04-25 DIAGNOSIS — B35.1 ONYCHOMYCOSIS DUE TO DERMATOPHYTE: ICD-10-CM

## 2024-04-25 DIAGNOSIS — E11.42 DIABETIC POLYNEUROPATHY ASSOCIATED WITH TYPE 2 DIABETES MELLITUS: Primary | ICD-10-CM

## 2024-04-25 DIAGNOSIS — M20.41 HAMMERTOES OF BOTH FEET: ICD-10-CM

## 2024-04-25 PROCEDURE — 3288F FALL RISK ASSESSMENT DOCD: CPT | Mod: CPTII,S$GLB,, | Performed by: PODIATRIST

## 2024-04-25 PROCEDURE — 11721 DEBRIDE NAIL 6 OR MORE: CPT | Mod: PBBFAC,PO | Performed by: PODIATRIST

## 2024-04-25 PROCEDURE — 1157F ADVNC CARE PLAN IN RCRD: CPT | Mod: CPTII,S$GLB,, | Performed by: PODIATRIST

## 2024-04-25 PROCEDURE — 99213 OFFICE O/P EST LOW 20 MIN: CPT | Mod: 25,S$GLB,, | Performed by: PODIATRIST

## 2024-04-25 PROCEDURE — 1159F MED LIST DOCD IN RCRD: CPT | Mod: CPTII,S$GLB,, | Performed by: PODIATRIST

## 2024-04-25 PROCEDURE — 1101F PT FALLS ASSESS-DOCD LE1/YR: CPT | Mod: CPTII,S$GLB,, | Performed by: PODIATRIST

## 2024-04-25 PROCEDURE — 3044F HG A1C LEVEL LT 7.0%: CPT | Mod: CPTII,S$GLB,, | Performed by: PODIATRIST

## 2024-04-25 PROCEDURE — 99213 OFFICE O/P EST LOW 20 MIN: CPT | Mod: PBBFAC,PO | Performed by: PODIATRIST

## 2024-04-25 PROCEDURE — 11721 DEBRIDE NAIL 6 OR MORE: CPT | Mod: Q9,S$GLB,, | Performed by: PODIATRIST

## 2024-04-25 PROCEDURE — 1126F AMNT PAIN NOTED NONE PRSNT: CPT | Mod: CPTII,S$GLB,, | Performed by: PODIATRIST

## 2024-04-25 PROCEDURE — 99999 PR PBB SHADOW E&M-EST. PATIENT-LVL III: CPT | Mod: PBBFAC,,, | Performed by: PODIATRIST

## 2024-04-25 RX ORDER — MUPIROCIN 20 MG/G
OINTMENT TOPICAL 3 TIMES DAILY
Qty: 30 G | Refills: 1 | Status: SHIPPED | OUTPATIENT
Start: 2024-04-25

## 2024-04-25 NOTE — PROGRESS NOTES
Subjective:      Patient ID: Lester Carmona Sr. is a 73 y.o. male.    Chief Complaint: Nail Care and Diabetic Foot Exam (PCP 4/24/24)      Lester is a 73 y.o. male who presents to the clinic for evaluation and treatment of diabetic feet. Lester has a past medical history of a Moderate-To-Severe Mitral Regurgitation, a Severe Chronic Systolic Congestive Heart Failure, a Severe Multivessel Coronary Artery Disease, b Chronic Hypotension, b Microalbuminuria, c Hypercholesterolemia With Low HDL, d Type 2 Diabetes Mellitus, f Obesity, g Mild Chronic NCNC Anemia, i H/O 1 PPD X 18 YRs TUD, Quit In 1995, i Left Sided Rib Area Pain, i Right Rib Area Pain After A Fall, j Dysphagia With H/O Empiric Dilatation, j GERD With Gastritis, j H/O Colon Polyps On Previous Colonoscopy (###), k Benign Prostatic Hypertrophy, k Chronic Interstitial Cystitis (###), l Bilateral Knee Osteoarthritis, l Fall With Right Sided Pain 10/20/15, l H/O Bilateral Knee Arthroscopy Procedures, l Lumbar Spinal DDD, m Bilateral Lower Extremity Diabetic Peripheral Neuropathy, n Anxiety And Depression, o Allergic Rhinosinusitis, o Chronic Bilateral Tinnitus And Hearing Loss, o Deviated Nasal Septum, o Poor Dentition (###), o Recurrent Bilateral Cerumen Impactions, p OU Glaucoma, q Bilateral Lower Extremity Pruritus (###), q Bilateral Toenail Onychomycosis, q Borderline Vitamin D Deficiency, q Cherry Hemangiomas, q H/O Right Buttock Hematoma 10/20/15, q Seborrheic Keratoses, Toe abscess, right, and Wellness Visit 4/24/2024. Patient relates having toenails that are in need of trimming.  Denies experiencing pain from this issue.  Has not attempted to self treat.  Notes good control over his blood glucose.  Requests a new pair of crest pads with today's exam.  Denies any additional pedal complaints.      PCP: Lester Guerra MD    Date Last Seen by PCP: 4/24    Hemoglobin A1C   Date Value Ref Range Status   04/03/2024 6.0 (H) <5.7 % of total Hgb Final      Comment:     For someone without known diabetes, a hemoglobin   A1c value between 5.7% and 6.4% is consistent with  prediabetes and should be confirmed with a   follow-up test.     For someone with known diabetes, a value <7%  indicates that their diabetes is well controlled. A1c  targets should be individualized based on duration of  diabetes, age, comorbid conditions, and other  considerations.     This assay result is consistent with an increased risk  of diabetes.     Currently, no consensus exists regarding use of  hemoglobin A1c for diagnosis of diabetes for children.        This test was performed on the Roche calixto c503 platform.  Effective 11/13/23, a change in test platforms from the  [employer]ott  to the Roche calixto c503 may have shifted  HbA1c results compared to historical results.  Based on laboratory validation testing conducted at  [facility], the Roche platform relative to the Abbott  platform had an average increase in HbA1c value of  < or = 0.3%. This difference is within accepted   variability established by the National Glycohemoglobin  Standardization Program. Note that not all individuals  will have had a shift in their results and direct  comparisons between historical and current results for  testing conducted on different platforms is not  recommended.         10/04/2023 5.7 (H) <5.7 % of total Hgb Final     Comment:     For someone without known diabetes, a hemoglobin   A1c value between 5.7% and 6.4% is consistent with  prediabetes and should be confirmed with a   follow-up test.     For someone with known diabetes, a value <7%  indicates that their diabetes is well controlled. A1c  targets should be individualized based on duration of  diabetes, age, comorbid conditions, and other  considerations.     This assay result is consistent with an increased risk  of diabetes.     Currently, no consensus exists regarding use of  hemoglobin A1c for diagnosis of diabetes for children.         07/05/2022 5.8 (H) <5.7 % of total Hgb Final     Comment:     For someone without known diabetes, a hemoglobin   A1c value between 5.7% and 6.4% is consistent with  prediabetes and should be confirmed with a   follow-up test.     For someone with known diabetes, a value <7%  indicates that their diabetes is well controlled. A1c  targets should be individualized based on duration of  diabetes, age, comorbid conditions, and other  considerations.     This assay result is consistent with an increased risk  of diabetes.     Currently, no consensus exists regarding use of  hemoglobin A1c for diagnosis of diabetes for children.                Past Medical History:   Diagnosis Date    a Moderate-To-Severe Mitral Regurgitation     Dr. Olivier Romano; Dr. Alonzo flores Severe Chronic Systolic Congestive Heart Failure     Dr. Olivier Romano; 12/09/21 Inc. Lasix, RXd Potassium and Referred to Dr Juan Antonio flores Severe Multivessel Coronary Artery Disease     Dr. Olivier Romano; Dr. Alonzo larsen Chronic Hypotension     Dr. Olivier Romano; No Lisinopril 2.5 Mg Daily Because Of Chronic Hypotension    b Microalbuminuria     No Lisinopril 2.5 Mg Daily Because Of Chronic Hypotension    c Hypercholesterolemia With Low HDL     8/7/17 RXd OTC Falxseed Oil 3K Mg Daily, And D/Cd His OTC FO 2K Daily; On Lipitor 40 Mg qHS     d Type 2 Diabetes Mellitus     4/19/15 Referred To [facility] ED[facility]    f Obesity     g Mild Chronic NCNC Anemia     Am Monitoring    i H/O 1 PPD X 18 YRs TUD, Quit In 1995     i Left Sided Rib Area Pain     1/31/18 Ordered A Left Rib XRay Series    i Right Rib Area Pain After A Fall     2/17/20 Ordered A Right Rib Series    j Dysphagia With H/O Empiric Dilatation     Dr. MANDY Diana; 7/1/16 EGD (With Empiric Dilation) = Normal Except For Gastritis (Was BXd)    j GERD With Gastritis     Dr. MANDY Diana; 7/1/16 EGD (With Empiric Dilation) = Normal Except For Gastritis (Was BXd)    j H/O Colon Polyps On Previous Colonoscopy  "###    Dr. MANDY Diana; His 10/5/22 Cologuard = Was Negative    k Benign Prostatic Hypertrophy     Dr. Sam Rosales    k Chronic Interstitial Cystitis ###    Dr. Sam Rosales; Elmiron Caused Bilateral Leg Pruritis    l Bilateral Knee Osteoarthritis     12/9/16 Referred To Dr. Eder ugalde Fall With Right Sided Pain 10/20/15     l H/O Bilateral Knee Arthroscopy Procedures     l Lumbar Spinal DDD     4/19/15 RXd Mobic 15 Mg Daily PRN; He Has Had No SX Or ESIs    m Bilateral Lower Extremity Diabetic Peripheral Neuropathy     2/13/19 RXd Neurontin 100 Mg BID    n Anxiety And Depression     o Allergic Rhinosinusitis     Dr. Sahil Burks    o Chronic Bilateral Tinnitus And Hearing Loss     Dr. Sahil Burks; Christiano Barnard (Audiologist) 6/27/16 Tested Patient And Recommended Hearing Aids    o Deviated Nasal Septum     Dr. Sahil Burks    o Poor Dentition ###    5/24/19 He Will Be Having A Complete Dental Extraction Soon (I Filled Forms Out Today)    o Recurrent Bilateral Cerumen Impactions     Dr. Sahil Burks 6/15/16 Performed Disimpaction    p OU Glaucoma     Dr. Villarreal "Arlen" Vanessa    q Bilateral Lower Extremity Pruritus ###    RTC In 2 Weeks; 5/24/19 Held Elmiron And RXd Lotrisone Cream BID; 6/3/19 BLE AA U/S = Unremarkable (See Report)    q Bilateral Toenail Onychomycosis     4/24/24 RXd Lamisil X 3 Months With Labs In 1 Month    q Borderline Vitamin D Deficiency     2/10/19 RXd OTC D3 2K IU Daily    q Cherry Hemangiomas     Dr. Anthony Dorantes; Right Postauricular Hemangioma Was Resected 7/27/16    q H/O Right Buttock Hematoma 10/20/15     q Seborrheic Keratoses     Dr. Anthony Dorantes; Right Postauricular Seborrheic Keratosis Was Resected 7/27/16    Toe abscess, right     Wellness Visit 4/24/2024        Past Surgical History:   Procedure Laterality Date    CATHETERIZATION OF BOTH LEFT AND RIGHT HEART N/A 2/8/2022    Procedure: CATHETERIZATION, HEART, BOTH LEFT AND RIGHT;  Surgeon: Olivier TRIMBLE" MD Juan Antonio;  Location: [facility] CATH;  Service: Cardiology;  Laterality: N/A;    COLONOSCOPY      CORONARY ANGIOGRAPHY N/A 2022    Procedure: ANGIOGRAM, CORONARY ARTERY;  Surgeon: Olivier Romano MD;  Location: [facility] CATH;  Service: Cardiology;  Laterality: N/A;    HERNIA REPAIR      KNEE SURGERY      NOSE SURGERY      TESTICLE SURGERY         Family History   Problem Relation Name Age of Onset    Diabetes Mother      Heart disease Mother      Hypertension Mother      COPD Mother      Arthritis Mother      Cancer Sister         Social History     Socioeconomic History    Marital status:    Tobacco Use    Smoking status: Former     Current packs/day: 0.00     Types: Cigarettes     Quit date:      Years since quittin.3     Passive exposure: Past    Smokeless tobacco: Never   Substance and Sexual Activity    Alcohol use: Never    Drug use: Never     Social Determinants of Health     Financial Resource Strain: Low Risk  (2024)    Overall Financial Resource Strain (CARDIA)     Difficulty of Paying Living Expenses: Not hard at all   Food Insecurity: No Food Insecurity (2024)    Hunger Vital Sign     Worried About Running Out of Food in the Last Year: Never true     Ran Out of Food in the Last Year: Never true   Transportation Needs: No Transportation Needs (2024)    PRAPARE - Transportation     Lack of Transportation (Medical): No     Lack of Transportation (Non-Medical): No   Physical Activity: Inactive (2024)    Exercise Vital Sign     Days of Exercise per Week: 0 days     Minutes of Exercise per Session: 0 min   Stress: No Stress Concern Present (2024)    [employer] - Occupational Stress Questionnaire     Feeling of Stress : Not at all   Social Connections: Unknown (2024)    Social Connection and Isolation Panel [NHANES]     Frequency of Communication with Friends and Family: Never     Frequency of Social Gatherings with Friends and Family: Never      Active Member of Clubs or Organizations: No     Attends Club or Organization Meetings: Never     Marital Status:    Housing Stability: Unknown (4/22/2024)    Housing Stability Vital Sign     Unable to Pay for Housing in the Last Year: No       Current Outpatient Medications   Medication Sig Dispense Refill    acetaminophen (TYLENOL) 325 MG tablet Take 1 tablet (325 mg total) by mouth 2 (two) times daily. (Patient taking differently: Take 325 mg by mouth once daily.) 60 tablet 3    aspirin (ECOTRIN) 81 MG EC tablet Take 1 tablet (81 mg total) by mouth once daily. 90 tablet 3    atorvastatin (LIPITOR) 40 MG tablet Take 1 tablet (40 mg total) by mouth nightly. 90 tablet 3    baclofen (LIORESAL) 10 MG tablet TAKE 1 TABLET 3 TIMES DAILYAS NEEDED 270 tablet 3    blood glucose control, low (EASYMAX LOW CONTROL) Soln 1 each by Misc.(Non-Drug; Combo Route) route before breakfast. 3 each 3    blood sugar diagnostic Strp To check BG 2 times daily, to use with insurance preferred meter 200 each 3    bumetanide (BUMEX) 2 MG tablet Take 1 tablet (2 mg total) by mouth once daily. 90 tablet 3    cholecalciferol, vitamin D3, (VITAMIN D3) 50 mcg (2,000 unit) Tab Take 1 tablet (2,000 Units total) by mouth once daily.      diclofenac sodium (VOLTAREN) 1 % Gel Apply 2 g topically 2 (two) times daily as needed (for painful arthritis areas). 200 g 6    esomeprazole (NEXIUM) 40 MG capsule Take 1 capsule (40 mg total) by mouth before breakfast. 90 capsule 3    finasteride (PROSCAR) 5 mg tablet Take 1 tablet (5 mg total) by mouth once daily. 90 tablet 3    gabapentin (NEURONTIN) 100 MG capsule Take 1 capsule (100 mg total) by mouth 2 (two) times daily. 180 capsule 1    ganciclovir (ZIRGAN) 0.15 % Gel Apply to eye.      Lactobacillus rhamnosus GG (CULTURELLE) 10 billion cell capsule Take 1 capsule by mouth once daily.      lancets Misc To check BG 2 times daily, to use with insurance preferred meter 200 each 3    magnesium 30 mg  Tab Take 250 mg by mouth once.      meloxicam (MOBIC) 15 MG tablet Take 1 tablet (15 mg total) by mouth daily as needed for Pain (take with food). 90 tablet 3    metFORMIN (GLUCOPHAGE) 1000 MG tablet Take 1 tablet (1,000 mg total) by mouth 2 (two) times daily. 180 tablet 3    metoprolol succinate (TOPROL-XL) 25 MG 24 hr tablet Take 1 tablet (25 mg total) by mouth once daily. 90 tablet 3    mirabegron (MYRBETRIQ) 25 mg Tb24 ER tablet Take 1 tablet (25 mg total) by mouth once daily. 90 tablet 3    multivitamin capsule Take 1 capsule by mouth once daily.      potassium chloride SA (K-DUR,KLOR-CON M) 10 MEQ tablet Take 1 tablet (10 mEq total) by mouth 2 (two) times daily. 180 tablet 3    terbinafine HCL (LAMISIL) 250 mg tablet Take 1 tablet (250 mg total) by mouth once daily. 90 tablet 0    travoprost (TRAVATAN Z) 0.004 % ophthalmic solution Place 1 drop into both eyes once daily. 5 mL 2     No current facility-administered medications for this visit.       Review of patient's allergies indicates:   Allergen Reactions    Elmiron [pentosan polysulfate sodium]      Bilateral Lower Extremity Pruritis         Review of Systems   Constitutional: Negative for chills and fever.   Cardiovascular:  Positive for leg swelling. Negative for claudication.   Skin:  Positive for color change and nail changes. Negative for dry skin, rash and suspicious lesions.   Musculoskeletal:  Negative for muscle cramps, muscle weakness and myalgias.   Gastrointestinal:  Negative for nausea and vomiting.   Neurological:  Positive for numbness. Negative for paresthesias.   Psychiatric/Behavioral:  Negative for altered mental status.            Objective:      Physical Exam  Constitutional:       Appearance: Normal appearance. He is not ill-appearing.   Cardiovascular:      Pulses:           Dorsalis pedis pulses are 2+ on the right side and 2+ on the left side.        Posterior tibial pulses are 2+ on the right side and 2+ on the left side.       Comments: CFT is < 3 seconds bilateral.  Pedal hair growth is decreased bilateral.  Varicosities noted bilateral.  Moderate nonpitting lower extremity edema noted bilateral.  Toes are cool to touch bilateral.    Musculoskeletal:         General: Deformity present. No tenderness.      Right lower leg: Edema present.      Left lower leg: Edema present.      Comments: Muscle strength 5/5 in all muscle groups bilateral.  No tenderness nor crepitation with ROM of foot/ankle joints bilateral.  No tenderness with palpation of bilateral foot and ankle.  Bilateral rigid contracture of toes 2-5.     Skin:     General: Skin is warm and dry.      Capillary Refill: Capillary refill takes 2 to 3 seconds.      Findings: No bruising, ecchymosis, erythema, signs of injury, laceration, lesion, petechiae, rash or wound.      Comments: Pedal skin appears thin bilateral.  Toenails x 10 appear thickened by 2 mm, elongated by 8 mm, and discolored with subungual debris.  Skin irritation noted to the medial aspect of the Lt. 4th toe secondary to shearing.   Neurological:      Sensory: Sensory deficit present.      Motor: No weakness or atrophy.      Comments: Protective sensation per [place] monofilament is absent bilateral.  Vibratory sensation is absent bilateral.  Light touch is absent bilateral.               Assessment:       Encounter Diagnoses   Name Primary?    Diabetic polyneuropathy associated with type 2 diabetes mellitus Yes    Hammertoes of both feet     Onychomycosis due to dermatophyte          Plan:       Lester was seen today for nail care and diabetic foot exam.    Diagnoses and all orders for this visit:    Diabetic polyneuropathy associated with type 2 diabetes mellitus    Hammertoes of both feet    Onychomycosis due to dermatophyte      I counseled the patient on his conditions, their implications and medical management.    Advised to continue wearing diabetic shoes to accommodate digital deformities.     To  continue wearing crest pads to offload bilateral contracture of the lesser digits.     Advised to apply mupirocin ointment to the area of irritated skin along the Lt. Medial 4th toe.  Also, recommend offloading the area with a small piece of cotton in efforts to prevent ulceration.    Shoe inspection. Diabetic Foot Education. Patient reminded of the importance of good nutrition and blood sugar control to help prevent podiatric complications of diabetes. Patient instructed on proper foot hygeine. We discussed wearing proper shoe gear, daily foot inspections, never walking without protective shoe gear, never putting sharp instruments to feet    With patient's permission, nails were aggressively reduced and debrided x 10 to their soft tissue attachment mechanically and with electric , removing all offending nail and debris.  Patient relates relief following the procedure.  He will continue to monitor the areas daily, inspect his feet, wear protective shoe gear when ambulatory, moisturizer to maintain skin integrity and follow in this office in approximately 3 months, sooner p.r.n.    Beka Choudhury DPM

## 2024-08-29 ENCOUNTER — TELEPHONE (OUTPATIENT)
Dept: PODIATRY | Facility: CLINIC | Age: 74
End: 2024-08-29
Payer: MEDICARE

## 2024-08-29 NOTE — TELEPHONE ENCOUNTER
----- Message from Kristina Peraza sent at 8/29/2024 11:59 AM CDT -----  Contact: Spouse Margarette  Type:  Sooner Appointment Request    Caller is requesting a sooner appointment.  Caller declined first available appointment listed below.  Caller will not accept being placed on the waitlist and is requesting a message be sent to doctor.    Name of Caller:  Patients wife Margarette  When is the first available appointment?  10/02  Symptoms:  4 mo f/u needs rescheduling  Would the patient rather a call back or a response via [facility]? Call  Best Call Back Number:  [phone]  Additional Information:  Pt was scheduled this afternoon and because he is not feeling well he had to cancel the appt. I tried to reschedule it but they are wanting something before October and is requesting an afternoon appt. Can we please call pt back to reschedule. Thank You

## 2024-10-01 ENCOUNTER — TELEPHONE (OUTPATIENT)
Dept: PODIATRY | Facility: CLINIC | Age: 74
End: 2024-10-01
Payer: MEDICARE

## 2024-10-01 NOTE — TELEPHONE ENCOUNTER
Spoke with the patient's wife to reschedule to first available appointment per provider's request. He was scheduled for 11/6/24 @ 4:40 pm. She expressed understanding of the message.

## 2024-11-20 ENCOUNTER — OFFICE VISIT (OUTPATIENT)
Dept: PODIATRY | Facility: CLINIC | Age: 74
End: 2024-11-20
Payer: MEDICARE

## 2024-11-20 VITALS — HEIGHT: 64 IN | BODY MASS INDEX: 25.63 KG/M2 | WEIGHT: 150.13 LBS

## 2024-11-20 DIAGNOSIS — E11.42 DIABETIC POLYNEUROPATHY ASSOCIATED WITH TYPE 2 DIABETES MELLITUS: Primary | ICD-10-CM

## 2024-11-20 DIAGNOSIS — M20.42 HAMMERTOES OF BOTH FEET: ICD-10-CM

## 2024-11-20 DIAGNOSIS — B35.1 ONYCHOMYCOSIS DUE TO DERMATOPHYTE: ICD-10-CM

## 2024-11-20 DIAGNOSIS — M20.41 HAMMERTOES OF BOTH FEET: ICD-10-CM

## 2024-11-20 PROCEDURE — 99999 PR PBB SHADOW E&M-EST. PATIENT-LVL II: CPT | Mod: PBBFAC,,, | Performed by: PODIATRIST

## 2024-11-21 NOTE — PROGRESS NOTES
Subjective:      Patient ID: Lester Carmona Sr. is a 74 y.o. male.    Chief Complaint: Diabetic Foot Exam and Diabetes Mellitus      Lester is a 74 y.o. male who presents to the clinic for evaluation and treatment of diabetic feet. Lester has a past medical history of a Mild Right Carotid Artery Stenosis (06/08/2024), a Moderate-To-Severe Mitral Regurgitation, a Severe Chronic Systolic Congestive Heart Failure, a Severe Multivessel Coronary Artery Disease, b Chronic Hypotension, b Microalbuminuria, c Hypercholesterolemia With Low HDL, d Type 2 Diabetes Mellitus, f Obesity, g Mild Chronic NCNC Anemia, i H/O 1 PPD X 18 YRs TUD, Quit In 1995, i Left Sided Rib Area Pain, i Right Rib Area Pain After A Fall, j Dysphagia With H/O Empiric Dilatation, j GERD With Gastritis, j H/O Colon Polyps On Previous Colonoscopy (###), k Benign Prostatic Hypertrophy, k Chronic Interstitial Cystitis (###), l Bilateral Knee Osteoarthritis, l Fall With Right Sided Pain 10/20/15, l H/O Bilateral Knee Arthroscopy Procedures, l Lumbar Spinal DDD, m Bilateral Lower Extremity Diabetic Peripheral Neuropathy, n Anxiety And Depression, o Allergic Rhinosinusitis, o Chronic Bilateral Tinnitus And Hearing Loss, o Deviated Nasal Septum, o Poor Dentition (###), o Recurrent Bilateral Cerumen Impactions, p OU Glaucoma, q Bilateral Lower Extremity Pruritus (###), q Bilateral Toenail Onychomycosis, q Borderline Vitamin D Deficiency, q Cherry Hemangiomas, q H/O Right Buttock Hematoma 10/20/15, q Seborrheic Keratoses, and Wellness Visit 4/24/2024. Patient relates having toenails that are in need of trimming.  Denies experiencing pain from this issue.  Has not attempted to self treat.  Continues with good control over his blood glucose.  Denies any additional pedal complaints.      PCP: Lester Guerra MD    Date Last Seen by PCP: 10/24    Hemoglobin A1C   Date Value Ref Range Status   10/22/2024 6.0 (H) <5.7 % of total Hgb Final     Comment:     For  someone without known diabetes, a hemoglobin   A1c value between 5.7% and 6.4% is consistent with  prediabetes and should be confirmed with a   follow-up test.     For someone with known diabetes, a value <7%  indicates that their diabetes is well controlled. A1c  targets should be individualized based on duration of  diabetes, age, comorbid conditions, and other  considerations.     This assay result is consistent with an increased risk  of diabetes.     Currently, no consensus exists regarding use of  hemoglobin A1c for diagnosis of diabetes for children.        04/03/2024 6.0 (H) <5.7 % of total Hgb Final     Comment:     For someone without known diabetes, a hemoglobin   A1c value between 5.7% and 6.4% is consistent with  prediabetes and should be confirmed with a   follow-up test.     For someone with known diabetes, a value <7%  indicates that their diabetes is well controlled. A1c  targets should be individualized based on duration of  diabetes, age, comorbid conditions, and other  considerations.     This assay result is consistent with an increased risk  of diabetes.     Currently, no consensus exists regarding use of  hemoglobin A1c for diagnosis of diabetes for children.        This test was performed on the Roche calixto c503 platform.  Effective 11/13/23, a change in test platforms from the  Abbott  to the Roche calixto c503 may have shifted  HbA1c results compared to historical results.  Based on laboratory validation testing conducted at  [facility], the Roche platform relative to the Abbott  platform had an average increase in HbA1c value of  < or = 0.3%. This difference is within accepted   variability established by the National Glycohemoglobin  Standardization Program. Note that not all individuals  will have had a shift in their results and direct  comparisons between historical and current results for  testing conducted on different platforms is not  recommended.         10/04/2023 5.7 (H) <5.7  % of total Hgb Final     Comment:     For someone without known diabetes, a hemoglobin   A1c value between 5.7% and 6.4% is consistent with  prediabetes and should be confirmed with a   follow-up test.     For someone with known diabetes, a value <7%  indicates that their diabetes is well controlled. A1c  targets should be individualized based on duration of  diabetes, age, comorbid conditions, and other  considerations.     This assay result is consistent with an increased risk  of diabetes.     Currently, no consensus exists regarding use of  hemoglobin A1c for diagnosis of diabetes for children.                Past Medical History:   Diagnosis Date    a Mild Right Carotid Artery Stenosis 06/08/2024    Will Repeat A Carotids U/S In 2 Years; 5/16/24 Bilateral Carotid AA U/S = Mild ADILSON And No LICA Disease (See Report)    a Moderate-To-Severe Mitral Regurgitation     Dr. Olivier Romano; Dr. Alonzo Rubio    a Severe Chronic Systolic Congestive Heart Failure     Dr. Olivier Romano; 12/09/21 Inc. Lasix, RXd Potassium and Referred to Dr Romano    a Severe Multivessel Coronary Artery Disease     Dr. Olivier Romano; Dr. Alonzo Rubio    b Chronic Hypotension     Dr. Olivier Romano; No Lisinopril 2.5 Mg Daily Because Of Chronic Hypotension    b Microalbuminuria     No Lisinopril 2.5 Mg Daily Because Of Chronic Hypotension    c Hypercholesterolemia With Low HDL     8/7/17 RXd OTC Falxseed Oil 3K Mg Daily, And D/Cd His OTC FO 2K Daily; On Lipitor 40 Mg qHS     d Type 2 Diabetes Mellitus     4/19/15 Referred To [facility]    f Obesity     g Mild Chronic NCNC Anemia     Am Monitoring    i H/O 1 PPD X 18 YRs TUD, Quit In 1995     i Left Sided Rib Area Pain     1/31/18 Ordered A Left Rib XRay Series    i Right Rib Area Pain After A Fall     2/17/20 Ordered A Right Rib Series    j Dysphagia With H/O Empiric Dilatation     Dr. MANDY Diana; 7/1/16 EGD (With Empiric Dilation) = Normal Except For Gastritis (Was BXd)    j GERD With Gastritis      "Dr. MANDY Diana; 7/1/16 EGD (With Empiric Dilation) = Normal Except For Gastritis (Was BXd)    j H/O Colon Polyps On Previous Colonoscopy ###    Dr. MANDY Diana; His 10/5/22 Cologuard = Was Negative    k Benign Prostatic Hypertrophy     Dr. Sam Rosales    k Chronic Interstitial Cystitis ###    Dr. Sam Rosales; Elmiron Caused Bilateral Leg Pruritis    l Bilateral Knee Osteoarthritis     12/9/16 Referred To Dr. Eder ugalde Fall With Right Sided Pain 10/20/15     l H/O Bilateral Knee Arthroscopy Procedures     l Lumbar Spinal DDD     4/19/15 RXd Mobic 15 Mg Daily PRN; He Has Had No SX Or ESIs    m Bilateral Lower Extremity Diabetic Peripheral Neuropathy     2/13/19 RXd Neurontin 100 Mg BID    n Anxiety And Depression     o Allergic Rhinosinusitis     Dr. Sahil Burks    o Chronic Bilateral Tinnitus And Hearing Loss     Dr. Sahil Burks; Christiano Barnard (Audiologist) 6/27/16 Tested Patient And Recommended Hearing Aids    o Deviated Nasal Septum     Dr. Sahil Burks    o Poor Dentition ###    5/24/19 He Will Be Having A Complete Dental Extraction Soon (I Filled Forms Out Today)    o Recurrent Bilateral Cerumen Impactions     Dr. Sahil Burks 6/15/16 Performed Disimpaction    p OU Glaucoma     Dr. Villarreal "Arlen" Vanessa    q Bilateral Lower Extremity Pruritus ###    RTC In 2 Weeks; 5/24/19 Held Elmiron And RXd Lotrisone Cream BID; 6/3/19 BLE AA U/S = Unremarkable (See Report)    q Bilateral Toenail Onychomycosis     4/24/24 RXd Lamisil X 3 Months    q Borderline Vitamin D Deficiency     2/10/19 RXd OTC D3 2K IU Daily    q Cherry Hemangiomas     Dr. Anthony Dorantes; Right Postauricular Hemangioma Was Resected 7/27/16    q H/O Right Buttock Hematoma 10/20/15     q Seborrheic Keratoses     Dr. Anthony Dorantes; Right Postauricular Seborrheic Keratosis Was Resected 7/27/16    Wellness Visit 4/24/2024        Past Surgical History:   Procedure Laterality Date    CATHETERIZATION OF BOTH LEFT AND " RIGHT HEART N/A 2022    Procedure: CATHETERIZATION, HEART, BOTH LEFT AND RIGHT;  Surgeon: Olivier Romano MD;  Location: [facility] CATH;  Service: Cardiology;  Laterality: N/A;    COLONOSCOPY      CORONARY ANGIOGRAPHY N/A 2022    Procedure: ANGIOGRAM, CORONARY ARTERY;  Surgeon: Olivier Romano MD;  Location: [facility] CATH;  Service: Cardiology;  Laterality: N/A;    HERNIA REPAIR      KNEE SURGERY      NOSE SURGERY      TESTICLE SURGERY         Family History   Problem Relation Name Age of Onset    Diabetes Mother      Heart disease Mother      Hypertension Mother      COPD Mother      Arthritis Mother      Cancer Sister         Social History     Socioeconomic History    Marital status:    Tobacco Use    Smoking status: Former     Current packs/day: 0.00     Types: Cigarettes     Quit date:      Years since quittin.9     Passive exposure: Past    Smokeless tobacco: Never   Substance and Sexual Activity    Alcohol use: Never    Drug use: Never     Social Drivers of Health     Financial Resource Strain: Low Risk  (2024)    Overall Financial Resource Strain (CARDIA)     Difficulty of Paying Living Expenses: Not hard at all   Food Insecurity: No Food Insecurity (2024)    Hunger Vital Sign     Worried About Running Out of Food in the Last Year: Never true     Ran Out of Food in the Last Year: Never true   Transportation Needs: No Transportation Needs (2024)    PRAPARE - Transportation     Lack of Transportation (Medical): No     Lack of Transportation (Non-Medical): No   Physical Activity: Inactive (2024)    Exercise Vital Sign     Days of Exercise per Week: 0 days     Minutes of Exercise per Session: 0 min   Stress: No Stress Concern Present (2024)    [employer] [place] [employer] - Occupational Stress Questionnaire     Feeling of Stress : Not at all   Housing Stability: Unknown (2024)    Housing Stability Vital Sign     Unable to Pay for Housing in the Last  Year: No       Current Outpatient Medications   Medication Sig Dispense Refill    acetaminophen (TYLENOL) 325 MG tablet Take 1 tablet (325 mg total) by mouth 2 (two) times daily. 60 tablet 3    alpha lipoic acid 600 mg Cap Take 1 capsule by mouth once daily. 90 each 1    aspirin (ECOTRIN) 81 MG EC tablet Take 1 tablet (81 mg total) by mouth once daily. 90 tablet 3    atorvastatin (LIPITOR) 40 MG tablet Take 1 tablet (40 mg total) by mouth every evening. 90 tablet 3    baclofen (LIORESAL) 10 MG tablet TAKE 1 TABLET 3 TIMES DAILYAS NEEDED 270 tablet 3    blood glucose control, low (EASYMAX LOW CONTROL) Soln 1 each by Misc.(Non-Drug; Combo Route) route before breakfast. 3 each 3    blood sugar diagnostic Strp To check BG 2 times daily, to use with insurance preferred meter 200 each 3    bumetanide (BUMEX) 2 MG tablet TAKE 1 TABLET BY MOUTH ONCE DAILY. 90 tablet 3    cholecalciferol, vitamin D3, (VITAMIN D3) 50 mcg (2,000 unit) Tab Take 1 tablet (2,000 Units total) by mouth once daily. Patient takes three times a week 180 tablet 0    diclofenac sodium (VOLTAREN) 1 % Gel Apply 2 g topically 2 (two) times daily as needed (for painful arthritis areas). 200 g 6    diphenoxylate-atropine 2.5-0.025 mg (LOMOTIL) 2.5-0.025 mg per tablet Take 1 tablet by mouth 4 (four) times daily as needed for Diarrhea. 30 tablet 1    esomeprazole (NEXIUM) 40 MG capsule Take 1 capsule (40 mg total) by mouth before breakfast. 90 capsule 3    finasteride (PROSCAR) 5 mg tablet Take 1 tablet (5 mg total) by mouth once daily. 90 tablet 3    gabapentin (NEURONTIN) 100 MG capsule TAKE 1 CAPSULE BY MOUTH TWICE A  capsule 1    ganciclovir (ZIRGAN) 0.15 % Gel Apply to eye.      Lactobacillus rhamnosus GG (CULTURELLE) 10 billion cell capsule Take 1 capsule by mouth once daily.      lancets Misc To check BG 2 times daily, to use with insurance preferred meter 200 each 3    magnesium 30 mg Tab Take 250 mg by mouth once.      meloxicam (MOBIC) 15 MG  tablet Take 1 tablet (15 mg total) by mouth daily as needed for Pain (take with food). 90 tablet 3    metFORMIN (GLUCOPHAGE) 1000 MG tablet Take 1 tablet (1,000 mg total) by mouth 2 (two) times daily. 180 tablet 3    metoprolol succinate (TOPROL-XL) 25 MG 24 hr tablet Take 1 tablet (25 mg total) by mouth once daily. 90 tablet 3    mirabegron (MYRBETRIQ) 25 mg Tb24 ER tablet Take 1 tablet (25 mg total) by mouth once daily. 90 tablet 3    multivitamin capsule Take 1 capsule by mouth once daily.      mupirocin (BACTROBAN) 2 % ointment Apply topically 3 (three) times daily. 30 g 1    potassium chloride SA (K-DUR,KLOR-CON M) 10 MEQ tablet Take 1 tablet (10 mEq total) by mouth 2 (two) times daily. 180 tablet 3    terbinafine HCL (LAMISIL) 250 mg tablet Take 250 mg by mouth once daily.      travoprost (TRAVATAN Z) 0.004 % ophthalmic solution Place 1 drop into both eyes once daily. 5 mL 2     No current facility-administered medications for this visit.       Review of patient's allergies indicates:   Allergen Reactions    Elmiron [pentosan polysulfate sodium]      Bilateral Lower Extremity Pruritis         Review of Systems   Constitutional: Negative for chills and fever.   Cardiovascular:  Positive for leg swelling. Negative for claudication.   Skin:  Positive for color change and nail changes. Negative for dry skin, rash and suspicious lesions.   Musculoskeletal:  Negative for muscle cramps, muscle weakness and myalgias.   Gastrointestinal:  Negative for nausea and vomiting.   Neurological:  Positive for numbness. Negative for paresthesias.   Psychiatric/Behavioral:  Negative for altered mental status.            Objective:      Physical Exam  Constitutional:       Appearance: Normal appearance. He is not ill-appearing.   Cardiovascular:      Pulses:           Dorsalis pedis pulses are 2+ on the right side and 2+ on the left side.        Posterior tibial pulses are 2+ on the right side and 2+ on the left side.       Comments: CFT is < 3 seconds bilateral.  Pedal hair growth is decreased bilateral.  Varicosities noted bilateral.  Moderate nonpitting lower extremity edema noted bilateral.  Toes are cool to touch bilateral.    Musculoskeletal:         General: Deformity present. No tenderness.      Right lower leg: Edema present.      Left lower leg: Edema present.      Comments: Muscle strength 5/5 in all muscle groups bilateral.  No tenderness nor crepitation with ROM of foot/ankle joints bilateral.  No tenderness with palpation of bilateral foot and ankle.  Bilateral rigid contracture of toes 2-5.     Skin:     General: Skin is warm and dry.      Capillary Refill: Capillary refill takes 2 to 3 seconds.      Findings: No bruising, ecchymosis, erythema, signs of injury, laceration, lesion, petechiae, rash or wound.      Comments: Pedal skin appears thin bilateral.  Toenails x 10 appear thickened by 2 mm, elongated by 6 mm, and discolored with subungual debris.     Neurological:      Sensory: Sensory deficit present.      Motor: No weakness or atrophy.      Comments: Protective sensation per [place] monofilament is absent bilateral.  Vibratory sensation is absent bilateral.  Light touch is absent bilateral.               Assessment:       Encounter Diagnoses   Name Primary?    Diabetic polyneuropathy associated with type 2 diabetes mellitus Yes    Hammertoes of both feet     Onychomycosis due to dermatophyte          Plan:       Lester was seen today for diabetic foot exam and diabetes mellitus.    Diagnoses and all orders for this visit:    Diabetic polyneuropathy associated with type 2 diabetes mellitus    Hammertoes of both feet    Onychomycosis due to dermatophyte      I counseled the patient on his conditions, their implications and medical management.    Advised to continue wearing diabetic shoes to accommodate digital deformities.     To continue wearing crest pads to offload bilateral contracture of the lesser  digits.     Shoe inspection. Diabetic Foot Education. Patient reminded of the importance of good nutrition and blood sugar control to help prevent podiatric complications of diabetes. Patient instructed on proper foot hygeine. We discussed wearing proper shoe gear, daily foot inspections, never walking without protective shoe gear, never putting sharp instruments to feet    With patient's permission, nails were aggressively reduced and debrided x 10 to their soft tissue attachment mechanically and with electric , removing all offending nail and debris.  Patient relates relief following the procedure.  He will continue to monitor the areas daily, inspect his feet, wear protective shoe gear when ambulatory, moisturizer to maintain skin integrity and follow in this office in approximately 4 months, sooner p.r.n.    Beka Choudhury DPM

## 2025-03-14 ENCOUNTER — TELEPHONE (OUTPATIENT)
Dept: PODIATRY | Facility: CLINIC | Age: 75
End: 2025-03-14
Payer: MEDICARE

## 2025-03-14 NOTE — TELEPHONE ENCOUNTER
Called to reschedule appointment per provider's request/surgery. I spoke with the patient's wife to schedule first available appointment for 05/08/25 @ 3:40 pm. She expressed understanding of the message.